# Patient Record
Sex: MALE | Race: WHITE | NOT HISPANIC OR LATINO | Employment: OTHER | URBAN - METROPOLITAN AREA
[De-identification: names, ages, dates, MRNs, and addresses within clinical notes are randomized per-mention and may not be internally consistent; named-entity substitution may affect disease eponyms.]

---

## 2017-06-04 ENCOUNTER — HOSPITAL ENCOUNTER (EMERGENCY)
Facility: HOSPITAL | Age: 62
Discharge: ANOTHER HOSPITAL | End: 2017-06-04
Attending: EMERGENCY MEDICINE
Payer: COMMERCIAL

## 2017-06-04 VITALS
HEART RATE: 66 BPM | BODY MASS INDEX: 27.2 KG/M2 | SYSTOLIC BLOOD PRESSURE: 138 MMHG | DIASTOLIC BLOOD PRESSURE: 87 MMHG | RESPIRATION RATE: 16 BRPM | TEMPERATURE: 97 F | OXYGEN SATURATION: 99 % | HEIGHT: 70 IN | WEIGHT: 190 LBS

## 2017-06-04 DIAGNOSIS — I21.4 NSTEMI (NON-ST ELEVATED MYOCARDIAL INFARCTION): ICD-10-CM

## 2017-06-04 DIAGNOSIS — R07.9 CHEST PAIN: Primary | ICD-10-CM

## 2017-06-04 LAB
ALBUMIN SERPL BCP-MCNC: 4.7 G/DL
ALP SERPL-CCNC: 71 U/L
ALT SERPL W/O P-5'-P-CCNC: 30 U/L
ANION GAP SERPL CALC-SCNC: 15 MMOL/L
APTT BLDCRRT: 27.1 SEC
AST SERPL-CCNC: 29 U/L
BASOPHILS # BLD AUTO: 0.1 K/UL
BASOPHILS NFR BLD: 0.4 %
BILIRUB SERPL-MCNC: 0.7 MG/DL
BUN SERPL-MCNC: 23 MG/DL
CALCIUM SERPL-MCNC: 9.8 MG/DL
CHLORIDE SERPL-SCNC: 106 MMOL/L
CO2 SERPL-SCNC: 18 MMOL/L
CREAT SERPL-MCNC: 1.3 MG/DL
D DIMER PPP IA.FEU-MCNC: 0.2 MG/L FEU
DIFFERENTIAL METHOD: ABNORMAL
EOSINOPHIL # BLD AUTO: 0.1 K/UL
EOSINOPHIL NFR BLD: 0.5 %
ERYTHROCYTE [DISTWIDTH] IN BLOOD BY AUTOMATED COUNT: 14.4 %
EST. GFR  (AFRICAN AMERICAN): >60 ML/MIN/1.73 M^2
EST. GFR  (NON AFRICAN AMERICAN): 59 ML/MIN/1.73 M^2
GLUCOSE SERPL-MCNC: 141 MG/DL
HCT VFR BLD AUTO: 42.3 %
HGB BLD-MCNC: 14.7 G/DL
INR PPP: 1
LYMPHOCYTES # BLD AUTO: 2.3 K/UL
LYMPHOCYTES NFR BLD: 17.5 %
MCH RBC QN AUTO: 31.7 PG
MCHC RBC AUTO-ENTMCNC: 34.7 %
MCV RBC AUTO: 91 FL
MONOCYTES # BLD AUTO: 0.8 K/UL
MONOCYTES NFR BLD: 5.9 %
NEUTROPHILS # BLD AUTO: 9.8 K/UL
NEUTROPHILS NFR BLD: 75.7 %
PLATELET # BLD AUTO: 270 K/UL
PMV BLD AUTO: 8.3 FL
POTASSIUM SERPL-SCNC: 3.9 MMOL/L
PROT SERPL-MCNC: 7.9 G/DL
PROTHROMBIN TIME: 10.6 SEC
RBC # BLD AUTO: 4.63 M/UL
SODIUM SERPL-SCNC: 139 MMOL/L
TROPONIN I SERPL DL<=0.01 NG/ML-MCNC: 0.66 NG/ML
WBC # BLD AUTO: 13 K/UL

## 2017-06-04 PROCEDURE — 25000003 PHARM REV CODE 250: Performed by: EMERGENCY MEDICINE

## 2017-06-04 PROCEDURE — 99285 EMERGENCY DEPT VISIT HI MDM: CPT | Mod: 25

## 2017-06-04 PROCEDURE — 85379 FIBRIN DEGRADATION QUANT: CPT

## 2017-06-04 PROCEDURE — 93005 ELECTROCARDIOGRAM TRACING: CPT

## 2017-06-04 PROCEDURE — 85730 THROMBOPLASTIN TIME PARTIAL: CPT

## 2017-06-04 PROCEDURE — 80053 COMPREHEN METABOLIC PANEL: CPT

## 2017-06-04 PROCEDURE — 96365 THER/PROPH/DIAG IV INF INIT: CPT

## 2017-06-04 PROCEDURE — 85610 PROTHROMBIN TIME: CPT

## 2017-06-04 PROCEDURE — 96375 TX/PRO/DX INJ NEW DRUG ADDON: CPT

## 2017-06-04 PROCEDURE — 36415 COLL VENOUS BLD VENIPUNCTURE: CPT

## 2017-06-04 PROCEDURE — 85025 COMPLETE CBC W/AUTO DIFF WBC: CPT

## 2017-06-04 PROCEDURE — 84484 ASSAY OF TROPONIN QUANT: CPT

## 2017-06-04 RX ORDER — HEPARIN SODIUM 200 [USP'U]/100ML
17 INJECTION, SOLUTION INTRAVENOUS
Status: DISCONTINUED | OUTPATIENT
Start: 2017-06-04 | End: 2017-06-04

## 2017-06-04 RX ORDER — NITROGLYCERIN 20 MG/100ML
10 INJECTION INTRAVENOUS CONTINUOUS
Status: DISCONTINUED | OUTPATIENT
Start: 2017-06-04 | End: 2017-06-04 | Stop reason: HOSPADM

## 2017-06-04 RX ORDER — HEPARIN SODIUM,PORCINE/D5W 25000/250
17 INTRAVENOUS SOLUTION INTRAVENOUS CONTINUOUS
Status: DISCONTINUED | OUTPATIENT
Start: 2017-06-04 | End: 2017-06-04 | Stop reason: HOSPADM

## 2017-06-04 RX ORDER — ATORVASTATIN CALCIUM 20 MG/1
20 TABLET, FILM COATED ORAL
Status: DISCONTINUED | OUTPATIENT
Start: 2017-06-04 | End: 2017-06-04 | Stop reason: HOSPADM

## 2017-06-04 RX ORDER — METOPROLOL TARTRATE 1 MG/ML
INJECTION, SOLUTION INTRAVENOUS
Status: DISCONTINUED
Start: 2017-06-04 | End: 2017-06-04 | Stop reason: HOSPADM

## 2017-06-04 RX ORDER — NITROGLYCERIN 0.4 MG/1
0.4 TABLET SUBLINGUAL
Status: COMPLETED | OUTPATIENT
Start: 2017-06-04 | End: 2017-06-04

## 2017-06-04 RX ORDER — METOPROLOL TARTRATE 1 MG/ML
5 INJECTION, SOLUTION INTRAVENOUS
Status: COMPLETED | OUTPATIENT
Start: 2017-06-04 | End: 2017-06-04

## 2017-06-04 RX ORDER — HEPARIN SODIUM 5000 [USP'U]/ML
5000 INJECTION, SOLUTION INTRAVENOUS; SUBCUTANEOUS
Status: COMPLETED | OUTPATIENT
Start: 2017-06-04 | End: 2017-06-04

## 2017-06-04 RX ORDER — ASPIRIN 325 MG
325 TABLET, DELAYED RELEASE (ENTERIC COATED) ORAL
Status: COMPLETED | OUTPATIENT
Start: 2017-06-04 | End: 2017-06-04

## 2017-06-04 RX ORDER — NITROGLYCERIN 20 MG/100ML
INJECTION INTRAVENOUS
Status: DISCONTINUED
Start: 2017-06-04 | End: 2017-06-04 | Stop reason: HOSPADM

## 2017-06-04 RX ORDER — HEPARIN SODIUM 10000 [USP'U]/100ML
INJECTION, SOLUTION INTRAVENOUS
Status: DISCONTINUED
Start: 2017-06-04 | End: 2017-06-04 | Stop reason: HOSPADM

## 2017-06-04 RX ORDER — PANTOPRAZOLE SODIUM 40 MG/1
40 TABLET, DELAYED RELEASE ORAL
Status: COMPLETED | OUTPATIENT
Start: 2017-06-04 | End: 2017-06-04

## 2017-06-04 RX ADMIN — ASPIRIN 325 MG: 325 TABLET, DELAYED RELEASE ORAL at 03:06

## 2017-06-04 RX ADMIN — NITROGLYCERIN 1 INCH: 20 OINTMENT TOPICAL at 02:06

## 2017-06-04 RX ADMIN — NITROGLYCERIN 10 MCG/MIN: 20 INJECTION INTRAVENOUS at 03:06

## 2017-06-04 RX ADMIN — PANTOPRAZOLE SODIUM 40 MG: 40 TABLET, DELAYED RELEASE ORAL at 02:06

## 2017-06-04 RX ADMIN — HEPARIN SODIUM 5000 UNITS: 5000 INJECTION, SOLUTION INTRAVENOUS; SUBCUTANEOUS at 03:06

## 2017-06-04 RX ADMIN — LIDOCAINE HYDROCHLORIDE: 20 SOLUTION ORAL; TOPICAL at 02:06

## 2017-06-04 RX ADMIN — NITROGLYCERIN 0.4 MG: 0.4 TABLET SUBLINGUAL at 01:06

## 2017-06-04 RX ADMIN — METOPROLOL TARTRATE 5 MG: 5 INJECTION, SOLUTION INTRAVENOUS at 01:06

## 2017-06-04 NOTE — ED NOTES
"Currently pain free. Skin now dry. "Feeling much better" - S/P meds. O2 @ 4L/min & cardiac monitor in use  "

## 2017-06-04 NOTE — ED PROVIDER NOTES
Encounter Date: 6/4/2017    SCRIBE #1 NOTE: I, Nicola Cárdenas, am scribing for, and in the presence of, Dr. Chaparro .       History     Chief Complaint   Patient presents with    Chest Pain     off and on since this am with diaphoresis, lightheadedness.     Review of patient's allergies indicates:   Allergen Reactions    No known drug allergies      06/04/2017  1:56 PM     Chief Complaint: CP      The patient is a 61 y.o. male who is presenting c/o acute onset of CP since six hours ago. He describes his pain as an intermittent discomfort, 5/10 in severity prior to ED arrival, and located in center of his chest without radiating to his extremities. He reports that his chest pain first started six hours ago, stopped, reappeared three hours ago, and stopped again. Upon arrival to ED, he currently has chest pain, which began approximately 30 mins prior to arrival. He also c/o associated diaphoresis. His chest pain is currently a 1/10 in severity. He denies SOB, nausea, vomiting, neck pain, back pain, or leg pain. Pt denies previous hx of heart problems or FMHx of heart problems. Pt does not smoke cigarettes, which he stopped approximately 40 years ago. Pt has a PMHx of gout and HTN. Pt has a PSHx that includes skin graph.        The history is provided by the patient.     Past Medical History:   Diagnosis Date    Gout      Past Surgical History:   Procedure Laterality Date    skin graph       Family History   Problem Relation Age of Onset    Gout Father     Diabetes Father     Hypertension Mother      Social History   Substance Use Topics    Smoking status: Former Smoker    Smokeless tobacco: Not on file    Alcohol use 4.2 oz/week     7 Standard drinks or equivalent per week     Review of Systems   Constitutional: Positive for diaphoresis. Negative for activity change, appetite change, chills, fatigue and fever.   Eyes: Negative for visual disturbance.   Respiratory: Negative for apnea and shortness of breath.     Cardiovascular: Negative for chest pain (intermittent) and palpitations.   Gastrointestinal: Negative for abdominal pain, diarrhea, nausea and vomiting.   Genitourinary: Negative for difficulty urinating.   Musculoskeletal: Negative for back pain, myalgias (leg) and neck pain.   Skin: Negative for pallor and rash.   Neurological: Negative for headaches.   Hematological: Does not bruise/bleed easily.   Psychiatric/Behavioral: Negative for agitation.       Physical Exam     Initial Vitals [06/04/17 1343]   BP Pulse Resp Temp SpO2   134/89 81 16 97.4 °F (36.3 °C) 98 %     Physical Exam    Nursing note and vitals reviewed.  Constitutional: He appears well-developed and well-nourished. He is diaphoretic.   HENT:   Head: Normocephalic and atraumatic.   Eyes: Conjunctivae are normal.   Neck: Normal range of motion. Neck supple.   Cardiovascular: Normal rate, regular rhythm and normal heart sounds.   Pulmonary/Chest: Breath sounds normal. No respiratory distress.   Abdominal: Soft.   Musculoskeletal: Normal range of motion.   Neurological: He is alert and oriented to person, place, and time.   Skin: Skin is warm.   Psychiatric: He has a normal mood and affect.         ED Course   Critical Care  Date/Time: 6/4/2017 4:12 PM  Performed by: KYA WEATHERS III  Authorized by: KYA WEATHERS III   Direct patient critical care time: 150 minutes  Total critical care time (exclusive of procedural time) : 150 minutes  Critical care was time spent personally by me on the following activities: review of old charts, pulse oximetry, ordering and review of laboratory studies, obtaining history from patient or surrogate, evaluation of patient's response to treatment, discussions with consultants, development of treatment plan with patient or surrogate, discussions with primary provider, examination of patient, ordering and performing treatments and interventions, ordering and review of radiographic studies and re-evaluation of  patient's condition.  Subsequent provider of critical care: I assumed direction of critical care for this patient from another provider of my specialty.        Labs Reviewed   CBC W/ AUTO DIFFERENTIAL - Abnormal; Notable for the following:        Result Value    WBC 13.00 (*)     MCH 31.7 (*)     MPV 8.3 (*)     Gran # 9.8 (*)     Gran% 75.7 (*)     Lymph% 17.5 (*)     All other components within normal limits   COMPREHENSIVE METABOLIC PANEL - Abnormal; Notable for the following:     CO2 18 (*)     Glucose 141 (*)     eGFR if non  59 (*)     All other components within normal limits   TROPONIN I - Abnormal; Notable for the following:     Troponin I 0.663 (*)     All other components within normal limits   D DIMER, QUANTITATIVE   APTT    Narrative:     INR if patient is on warfarin prior to heparin therapy if not  ordered already   PROTIME-INR    Narrative:     INR if patient is on warfarin prior to heparin therapy if not  ordered already     EKG Readings: (Independently Interpreted)   Rhythm: Normal Sinus Rhythm. Heart Rate: 75. Ectopy: No Ectopy. Conduction: RBBB. ST Segments: Normal ST Segments. T Waves: Normal. Axis: Normal. Clinical Impression: Normal Sinus Rhythm          Medical Decision Making:   Independently Interpreted Test(s):   I have ordered and independently interpreted X-rays - see summary below.       <> Summary of X-Ray Reading(s): chest x-ray interpreted by me reveals no infiltrates, effusions or mediastinal widening  Clinical Tests:   Lab Tests: Ordered and Reviewed  The following lab test(s) were unremarkable: CBC and CMP  ED Management:  3:08 PM - Pt's chest pain has increased from 1/10 to 3/10 in severity.   Patient presents with intermittent chest pain which became more severe 30 minutes prior to arrival with associated diaphoresis.  Initial EKG reveals a right bundle branch block of uncertain acuity with no definitive ST-T wave changes.  Symptoms are strongly suggestive of an  MI with a significantly elevated troponin of 0.663 consistent with a non-ST elevation MI.  He isn't initiated on oral aspirin, Lipitor, and heparin infusion after a bolus and IV nitroglycerin.  After sublingual nitroglycerin initially the pain decreased from a 3-4 out of 10 down to a 1 out of 10 only to return 3 of 10.  Repeat EKG once again demonstrates no acute ST-T wave changes.  I discussed the case with Dr. Michaud proposes transfer to [facility] for cardiac catheterization.  Discussed with Dr. Mendoza who will admit the patient at [facility].              Scribe Attestation:   Scribe #1: I performed the above scribed service and the documentation accurately describes the services I performed. I attest to the accuracy of the note.    Attending Attestation:           Physician Attestation for Scribe:  Physician Attestation Statement for Scribe #1: I, Dr. Chaparro, reviewed documentation, as scribed by Nicola Cárdenas in my presence, and it is both accurate and complete.                 ED Course     Clinical Impression:   The primary encounter diagnosis was Chest pain. A diagnosis of NSTEMI (non-ST elevated myocardial infarction) was also pertinent to this visit.          Murphy Chaparro III, MD  06/04/17 8943

## 2017-06-04 NOTE — ED NOTES
Departs in NAD with [facility] with Heparin & Tridil infusing via pump bound for direct admit to ICU @ [facility]

## 2017-06-05 ENCOUNTER — HOSPITAL ENCOUNTER (INPATIENT)
Facility: HOSPITAL | Age: 62
LOS: 3 days | Discharge: HOME | DRG: 247 | End: 2017-06-08
Attending: INTERNAL MEDICINE | Admitting: INTERNAL MEDICINE
Payer: COMMERCIAL

## 2017-06-05 DIAGNOSIS — I25.110 CORONARY ARTERY DISEASE INVOLVING NATIVE CORONARY ARTERY OF NATIVE HEART WITH UNSTABLE ANGINA PECTORIS: ICD-10-CM

## 2017-06-05 DIAGNOSIS — I10 ESSENTIAL HYPERTENSION: ICD-10-CM

## 2017-06-05 DIAGNOSIS — R07.9 CHEST PAIN: ICD-10-CM

## 2017-06-05 DIAGNOSIS — I25.118 CORONARY ARTERY DISEASE INVOLVING NATIVE CORONARY ARTERY OF NATIVE HEART WITH OTHER FORM OF ANGINA PECTORIS: ICD-10-CM

## 2017-06-05 DIAGNOSIS — I21.4 NSTEMI (NON-ST ELEVATED MYOCARDIAL INFARCTION): Primary | ICD-10-CM

## 2017-06-05 DIAGNOSIS — M10.9 GOUT: ICD-10-CM

## 2017-06-05 LAB
ABO + RH BLD: NORMAL
ALBUMIN SERPL BCP-MCNC: 3.7 G/DL
ALP SERPL-CCNC: 62 U/L
ALT SERPL W/O P-5'-P-CCNC: 45 U/L
ANION GAP SERPL CALC-SCNC: 10 MMOL/L
APTT BLDCRRT: 35.7 SEC
AST SERPL-CCNC: 170 U/L
BASOPHILS # BLD AUTO: 0.01 K/UL
BASOPHILS NFR BLD: 0.1 %
BILIRUB SERPL-MCNC: 0.8 MG/DL
BLD GP AB SCN CELLS X3 SERPL QL: NORMAL
BNP SERPL-MCNC: 461 PG/ML
BUN SERPL-MCNC: 18 MG/DL
CALCIUM SERPL-MCNC: 8.7 MG/DL
CHLORIDE SERPL-SCNC: 105 MMOL/L
CO2 SERPL-SCNC: 22 MMOL/L
CREAT SERPL-MCNC: 1.2 MG/DL
DIFFERENTIAL METHOD: ABNORMAL
EOSINOPHIL # BLD AUTO: 0.1 K/UL
EOSINOPHIL NFR BLD: 0.7 %
ERYTHROCYTE [DISTWIDTH] IN BLOOD BY AUTOMATED COUNT: 14.1 %
EST. GFR  (AFRICAN AMERICAN): >60 ML/MIN/1.73 M^2
EST. GFR  (NON AFRICAN AMERICAN): >60 ML/MIN/1.73 M^2
GLUCOSE SERPL-MCNC: 108 MG/DL
HCT VFR BLD AUTO: 40.1 %
HGB BLD-MCNC: 13.5 G/DL
INR PPP: 1
LYMPHOCYTES # BLD AUTO: 1.3 K/UL
LYMPHOCYTES NFR BLD: 15.4 %
MAGNESIUM SERPL-MCNC: 1.8 MG/DL
MCH RBC QN AUTO: 31.9 PG
MCHC RBC AUTO-ENTMCNC: 33.7 %
MCV RBC AUTO: 95 FL
MONOCYTES # BLD AUTO: 0.6 K/UL
MONOCYTES NFR BLD: 7.4 %
NEUTROPHILS # BLD AUTO: 6.6 K/UL
NEUTROPHILS NFR BLD: 76.1 %
PHOSPHATE SERPL-MCNC: 2.5 MG/DL
PLATELET # BLD AUTO: 181 K/UL
PMV BLD AUTO: 10.6 FL
POTASSIUM SERPL-SCNC: 3.9 MMOL/L
PROT SERPL-MCNC: 6.6 G/DL
PROTHROMBIN TIME: 10.4 SEC
RBC # BLD AUTO: 4.23 M/UL
SODIUM SERPL-SCNC: 137 MMOL/L
WBC # BLD AUTO: 8.69 K/UL

## 2017-06-05 PROCEDURE — 85610 PROTHROMBIN TIME: CPT

## 2017-06-05 PROCEDURE — 93010 ELECTROCARDIOGRAM REPORT: CPT | Mod: S$GLB,,, | Performed by: INTERNAL MEDICINE

## 2017-06-05 PROCEDURE — 20000000 HC ICU ROOM

## 2017-06-05 PROCEDURE — 84100 ASSAY OF PHOSPHORUS: CPT

## 2017-06-05 PROCEDURE — 80061 LIPID PANEL: CPT

## 2017-06-05 PROCEDURE — 86901 BLOOD TYPING SEROLOGIC RH(D): CPT

## 2017-06-05 PROCEDURE — 83735 ASSAY OF MAGNESIUM: CPT

## 2017-06-05 PROCEDURE — 80053 COMPREHEN METABOLIC PANEL: CPT

## 2017-06-05 PROCEDURE — 85025 COMPLETE CBC W/AUTO DIFF WBC: CPT

## 2017-06-05 PROCEDURE — 83880 ASSAY OF NATRIURETIC PEPTIDE: CPT

## 2017-06-05 PROCEDURE — 85730 THROMBOPLASTIN TIME PARTIAL: CPT

## 2017-06-05 PROCEDURE — 86900 BLOOD TYPING SEROLOGIC ABO: CPT

## 2017-06-05 RX ORDER — CLOPIDOGREL 300 MG/1
600 TABLET, FILM COATED ORAL ONCE
Status: DISCONTINUED | OUTPATIENT
Start: 2017-06-05 | End: 2017-06-05

## 2017-06-05 RX ORDER — CLOPIDOGREL BISULFATE 75 MG/1
75 TABLET ORAL DAILY
Status: DISCONTINUED | OUTPATIENT
Start: 2017-06-06 | End: 2017-06-05

## 2017-06-05 RX ORDER — HEPARIN SODIUM,PORCINE/D5W 25000/250
17 INTRAVENOUS SOLUTION INTRAVENOUS CONTINUOUS
Status: DISCONTINUED | OUTPATIENT
Start: 2017-06-06 | End: 2017-06-06

## 2017-06-05 RX ORDER — SODIUM CHLORIDE 0.9 % (FLUSH) 0.9 %
3 SYRINGE (ML) INJECTION EVERY 8 HOURS
Status: DISCONTINUED | OUTPATIENT
Start: 2017-06-06 | End: 2017-06-08 | Stop reason: HOSPADM

## 2017-06-05 RX ORDER — ATORVASTATIN CALCIUM 20 MG/1
80 TABLET, FILM COATED ORAL DAILY
Status: DISCONTINUED | OUTPATIENT
Start: 2017-06-06 | End: 2017-06-08 | Stop reason: HOSPADM

## 2017-06-05 RX ORDER — ASPIRIN 81 MG/1
81 TABLET ORAL DAILY
Status: DISCONTINUED | OUTPATIENT
Start: 2017-06-06 | End: 2017-06-08 | Stop reason: HOSPADM

## 2017-06-05 RX ORDER — CLOPIDOGREL 300 MG/1
600 TABLET, FILM COATED ORAL ONCE
Status: COMPLETED | OUTPATIENT
Start: 2017-06-05 | End: 2017-06-06

## 2017-06-05 RX ORDER — CLOPIDOGREL BISULFATE 75 MG/1
75 TABLET ORAL DAILY
Status: DISCONTINUED | OUTPATIENT
Start: 2017-06-06 | End: 2017-06-08 | Stop reason: HOSPADM

## 2017-06-05 RX ADMIN — HEPARIN SODIUM AND DEXTROSE 17 UNITS/KG/HR: 10000; 5 INJECTION INTRAVENOUS at 11:06

## 2017-06-06 PROBLEM — I25.10 CORONARY ARTERY DISEASE INVOLVING NATIVE CORONARY ARTERY OF NATIVE HEART: Status: ACTIVE | Noted: 2017-06-06

## 2017-06-06 LAB
ALBUMIN SERPL BCP-MCNC: 3.5 G/DL
ALP SERPL-CCNC: 59 U/L
ALT SERPL W/O P-5'-P-CCNC: 38 U/L
ANION GAP SERPL CALC-SCNC: 9 MMOL/L
AST SERPL-CCNC: 119 U/L
BASOPHILS # BLD AUTO: 0.01 K/UL
BASOPHILS NFR BLD: 0.1 %
BILIRUB SERPL-MCNC: 0.9 MG/DL
BUN SERPL-MCNC: 14 MG/DL
CALCIUM SERPL-MCNC: 8.7 MG/DL
CHLORIDE SERPL-SCNC: 104 MMOL/L
CHOLEST/HDLC SERPL: 5.6 {RATIO}
CO2 SERPL-SCNC: 23 MMOL/L
CREAT SERPL-MCNC: 1 MG/DL
DIFFERENTIAL METHOD: ABNORMAL
EOSINOPHIL # BLD AUTO: 0.1 K/UL
EOSINOPHIL NFR BLD: 0.9 %
ERYTHROCYTE [DISTWIDTH] IN BLOOD BY AUTOMATED COUNT: 13.8 %
EST. GFR  (AFRICAN AMERICAN): >60 ML/MIN/1.73 M^2
EST. GFR  (NON AFRICAN AMERICAN): >60 ML/MIN/1.73 M^2
FACT X PPP CHRO-ACNC: 0.38 IU/ML
GLUCOSE SERPL-MCNC: 104 MG/DL
HCT VFR BLD AUTO: 38.4 %
HDL/CHOLESTEROL RATIO: 17.8 %
HDLC SERPL-MCNC: 185 MG/DL
HDLC SERPL-MCNC: 33 MG/DL
HGB BLD-MCNC: 13 G/DL
LDLC SERPL CALC-MCNC: 119.8 MG/DL
LYMPHOCYTES # BLD AUTO: 1 K/UL
LYMPHOCYTES NFR BLD: 12.9 %
MAGNESIUM SERPL-MCNC: 1.9 MG/DL
MCH RBC QN AUTO: 31.8 PG
MCHC RBC AUTO-ENTMCNC: 33.9 %
MCV RBC AUTO: 94 FL
MONOCYTES # BLD AUTO: 0.7 K/UL
MONOCYTES NFR BLD: 9 %
NEUTROPHILS # BLD AUTO: 6.2 K/UL
NEUTROPHILS NFR BLD: 76.8 %
NONHDLC SERPL-MCNC: 152 MG/DL
PLATELET # BLD AUTO: 173 K/UL
PMV BLD AUTO: 10.3 FL
POTASSIUM SERPL-SCNC: 3.9 MMOL/L
PROT SERPL-MCNC: 6.2 G/DL
RBC # BLD AUTO: 4.09 M/UL
SODIUM SERPL-SCNC: 136 MMOL/L
TRIGL SERPL-MCNC: 161 MG/DL
WBC # BLD AUTO: 8 K/UL

## 2017-06-06 PROCEDURE — 20600001 HC STEP DOWN PRIVATE ROOM

## 2017-06-06 PROCEDURE — 94761 N-INVAS EAR/PLS OXIMETRY MLT: CPT

## 2017-06-06 PROCEDURE — 25000003 PHARM REV CODE 250: Performed by: INTERNAL MEDICINE

## 2017-06-06 PROCEDURE — 93306 TTE W/DOPPLER COMPLETE: CPT

## 2017-06-06 PROCEDURE — 93306 TTE W/DOPPLER COMPLETE: CPT | Mod: 26,,, | Performed by: INTERNAL MEDICINE

## 2017-06-06 PROCEDURE — 27000221 HC OXYGEN, UP TO 24 HOURS

## 2017-06-06 PROCEDURE — 80053 COMPREHEN METABOLIC PANEL: CPT

## 2017-06-06 PROCEDURE — 85025 COMPLETE CBC W/AUTO DIFF WBC: CPT

## 2017-06-06 PROCEDURE — 85520 HEPARIN ASSAY: CPT

## 2017-06-06 PROCEDURE — 99222 1ST HOSP IP/OBS MODERATE 55: CPT | Mod: ,,, | Performed by: INTERNAL MEDICINE

## 2017-06-06 PROCEDURE — 99255 IP/OBS CONSLTJ NEW/EST HI 80: CPT | Mod: ,,, | Performed by: NURSE PRACTITIONER

## 2017-06-06 PROCEDURE — 83735 ASSAY OF MAGNESIUM: CPT

## 2017-06-06 RX ORDER — LOSARTAN POTASSIUM 50 MG/1
100 TABLET ORAL DAILY
Status: DISCONTINUED | OUTPATIENT
Start: 2017-06-07 | End: 2017-06-08 | Stop reason: HOSPADM

## 2017-06-06 RX ORDER — HYDROCODONE BITARTRATE AND ACETAMINOPHEN 5; 325 MG/1; MG/1
1 TABLET ORAL EVERY 6 HOURS PRN
Status: DISCONTINUED | OUTPATIENT
Start: 2017-06-06 | End: 2017-06-08 | Stop reason: HOSPADM

## 2017-06-06 RX ORDER — ACETAMINOPHEN 325 MG/1
650 TABLET ORAL EVERY 6 HOURS PRN
Status: DISCONTINUED | OUTPATIENT
Start: 2017-06-06 | End: 2017-06-08 | Stop reason: HOSPADM

## 2017-06-06 RX ORDER — METOPROLOL TARTRATE 25 MG/1
25 TABLET, FILM COATED ORAL 2 TIMES DAILY
Status: DISCONTINUED | OUTPATIENT
Start: 2017-06-06 | End: 2017-06-08 | Stop reason: HOSPADM

## 2017-06-06 RX ADMIN — ASPIRIN 81 MG: 81 TABLET, COATED ORAL at 08:06

## 2017-06-06 RX ADMIN — CLOPIDOGREL 75 MG: 75 TABLET, FILM COATED ORAL at 08:06

## 2017-06-06 RX ADMIN — ACETAMINOPHEN 650 MG: 325 TABLET ORAL at 06:06

## 2017-06-06 RX ADMIN — METOPROLOL TARTRATE 25 MG: 25 TABLET ORAL at 10:06

## 2017-06-06 RX ADMIN — Medication 3 ML: at 09:06

## 2017-06-06 RX ADMIN — HEPARIN SODIUM AND DEXTROSE 17 UNITS/KG/HR: 10000; 5 INJECTION INTRAVENOUS at 09:06

## 2017-06-06 RX ADMIN — CLOPIDOGREL BISULFATE 600 MG: 300 TABLET, FILM COATED ORAL at 01:06

## 2017-06-06 RX ADMIN — ACETAMINOPHEN 650 MG: 325 TABLET ORAL at 08:06

## 2017-06-06 RX ADMIN — METOPROLOL TARTRATE 25 MG: 25 TABLET ORAL at 08:06

## 2017-06-06 RX ADMIN — ATORVASTATIN CALCIUM 80 MG: 20 TABLET, FILM COATED ORAL at 08:06

## 2017-06-06 NOTE — PROGRESS NOTES
[facility]  Cardiology  Progress Note    Patient Name: Vidal Haddad  MRN: [id number]  Admission Date: 6/5/2017  Hospital Length of Stay: 1 days  Code Status: Full Code   Attending Physician: Lul Patel MD   Primary Care Physician: Jefe Gongora DO  Expected Discharge Date: 6/7/2017  Principal Problem:NSTEMI (non-ST elevated myocardial infarction)    Subjective:     Hospital Course:   61 year old male with hx of HTN presented to [facility] on 6/4/2017 with chest pain found to have NSTEMI. Had LHC on 6/5/2017 that showed LAD 95% with  of LCx and a trop of 24. Patient transferred to [facility] for PCI and CABG evaluation.      Interval History:   NAEON. Patient chest pain free currently. Provides no active complaints this am.    ROS   Constitutional: no fever or chills  ENT: no nasal congestion or sore throat  Respiratory: no cough or shortness of breath  Cardiovascular: no chest pain or palpitations  Gastrointestinal: no nausea or vomiting, no abdominal pain or abdominal distention   Genitourinary: no hematuria or dysuria  Integument/Breast: no rash or pruritis  Hematologic/Lymphatic: no easy bruising or lymphadenopathy  Musculoskeletal: no arthralgias or myalgias  Neurological: no seizures or tremors  Endocrine: no heat or cold intolerance    Objective:     Vital Signs (Most Recent):  Temp: 98.3 °F (36.8 °C) (06/05/17 2300)  Pulse: 69 (06/06/17 0800)  Resp: (!) 23 (06/06/17 0800)  BP: 115/74 (06/06/17 0800)  SpO2: 97 % (06/06/17 0800) Vital Signs (24h Range):  Temp:  [98.3 °F (36.8 °C)-98.5 °F (36.9 °C)] 98.3 °F (36.8 °C)  Pulse:  [60-75] 69  Resp:  [16-39] 23  SpO2:  [91 %-98 %] 97 %  BP: ()/(55-84) 115/74     Weight: 89 kg (196 lb 3.4 oz)  Body mass index is 28.98 kg/m².    SpO2: 97 %  O2 Device (Oxygen Therapy): nasal cannula      Intake/Output Summary (Last 24 hours) at 06/06/17 0815  Last data filed at 06/06/17 0400   Gross per 24 hour   Intake            71.22 ml   Output                 0 ml   Net            71.22 ml       Lines/Drains/Airways     Peripheral Intravenous Line                 Peripheral IV - Single Lumen 06/04/17 1353 Left Hand 1 day         Peripheral IV - Single Lumen 06/04/17 1354 Right Hand 1 day                Physical Exam    General: well developed, well nourished, appears stated age, no distress, pale  Eyes: conjunctivae/corneas clear. PERRL..  Neck: supple, symmetrical, trachea midline, no JVD  Cardiovascular: Heart: regular rate and rhythm, S1, S2 normal, no murmur, click, rub or gallop.  Lungs: clear to auscultation bilaterally and normal respiratory effort  Chest Wall: no tenderness  Abdomen/Rectal: Abdomen: Non distended. + BS. No masses. No TTP. No rebound or guarding. Negative Dang's sign. Rectal: not examined  Extremities: no edema, redness or tenderness in the calves or thighs. Pulses: 2+ and symmetric  Skin: Skin color, texture, turgor normal. No rashes or lesions  Musculoskeletal: full range of motion of joints  Lymph Nodes: No cervical or supraclavicular adenopathy  Psych/Behavioral: Normal. Alert and oriented, appropriate affect.    Significant Labs:   Recent Results (from the past 24 hour(s))   Comprehensive metabolic panel    Collection Time: 06/05/17 10:30 PM   Result Value Ref Range    Sodium 137 136 - 145 mmol/L    Potassium 3.9 3.5 - 5.1 mmol/L    Chloride 105 95 - 110 mmol/L    CO2 22 (L) 23 - 29 mmol/L    Glucose 108 70 - 110 mg/dL    BUN, Bld 18 8 - 23 mg/dL    Creatinine 1.2 0.5 - 1.4 mg/dL    Calcium 8.7 8.7 - 10.5 mg/dL    Total Protein 6.6 6.0 - 8.4 g/dL    Albumin 3.7 3.5 - 5.2 g/dL    Total Bilirubin 0.8 0.1 - 1.0 mg/dL    Alkaline Phosphatase 62 55 - 135 U/L     (H) 10 - 40 U/L    ALT 45 (H) 10 - 44 U/L    Anion Gap 10 8 - 16 mmol/L    eGFR if African American >60.0 >60 mL/min/1.73 m^2    eGFR if non African American >60.0 >60 mL/min/1.73 m^2   Magnesium    Collection Time: 06/05/17 10:30 PM   Result Value Ref Range    Magnesium 1.8  1.6 - 2.6 mg/dL   Phosphorus    Collection Time: 06/05/17 10:30 PM   Result Value Ref Range    Phosphorus 2.5 (L) 2.7 - 4.5 mg/dL   CBC auto differential    Collection Time: 06/05/17 10:30 PM   Result Value Ref Range    WBC 8.69 3.90 - 12.70 K/uL    RBC 4.23 (L) 4.60 - 6.20 M/uL    Hemoglobin 13.5 (L) 14.0 - 18.0 g/dL    Hematocrit 40.1 40.0 - 54.0 %    MCV 95 82 - 98 fL    MCH 31.9 (H) 27.0 - 31.0 pg    MCHC 33.7 32.0 - 36.0 %    RDW 14.1 11.5 - 14.5 %    Platelets 181 150 - 350 K/uL    MPV 10.6 9.2 - 12.9 fL    Gran # 6.6 1.8 - 7.7 K/uL    Lymph # 1.3 1.0 - 4.8 K/uL    Mono # 0.6 0.3 - 1.0 K/uL    Eos # 0.1 0.0 - 0.5 K/uL    Baso # 0.01 0.00 - 0.20 K/uL    Gran% 76.1 (H) 38.0 - 73.0 %    Lymph% 15.4 (L) 18.0 - 48.0 %    Mono% 7.4 4.0 - 15.0 %    Eosinophil% 0.7 0.0 - 8.0 %    Basophil% 0.1 0.0 - 1.9 %    Differential Method Automated    APTT    Collection Time: 06/05/17 10:30 PM   Result Value Ref Range    aPTT 35.7 (H) 21.0 - 32.0 sec   Protime-INR    Collection Time: 06/05/17 10:30 PM   Result Value Ref Range    Prothrombin Time 10.4 9.0 - 12.5 sec    INR 1.0 0.8 - 1.2   Brain natriuretic peptide    Collection Time: 06/05/17 10:30 PM   Result Value Ref Range     (H) 0 - 99 pg/mL   Type & Screen    Collection Time: 06/05/17 10:30 PM   Result Value Ref Range    Group & Rh O POS     Indirect Travon NEG    Lipid panel    Collection Time: 06/05/17 10:30 PM   Result Value Ref Range    Cholesterol 185 120 - 199 mg/dL    Triglycerides 161 (H) 30 - 150 mg/dL    HDL 33 (L) 40 - 75 mg/dL    LDL Cholesterol 119.8 63.0 - 159.0 mg/dL    HDL/Chol Ratio 17.8 (L) 20.0 - 50.0 %    Total Cholesterol/HDL Ratio 5.6 (H) 2.0 - 5.0    Non-HDL Cholesterol 152 mg/dL   CBC auto differential    Collection Time: 06/06/17  5:00 AM   Result Value Ref Range    WBC 8.00 3.90 - 12.70 K/uL    RBC 4.09 (L) 4.60 - 6.20 M/uL    Hemoglobin 13.0 (L) 14.0 - 18.0 g/dL    Hematocrit 38.4 (L) 40.0 - 54.0 %    MCV 94 82 - 98 fL    MCH 31.8 (H) 27.0  - 31.0 pg    MCHC 33.9 32.0 - 36.0 %    RDW 13.8 11.5 - 14.5 %    Platelets 173 150 - 350 K/uL    MPV 10.3 9.2 - 12.9 fL    Gran # 6.2 1.8 - 7.7 K/uL    Lymph # 1.0 1.0 - 4.8 K/uL    Mono # 0.7 0.3 - 1.0 K/uL    Eos # 0.1 0.0 - 0.5 K/uL    Baso # 0.01 0.00 - 0.20 K/uL    Gran% 76.8 (H) 38.0 - 73.0 %    Lymph% 12.9 (L) 18.0 - 48.0 %    Mono% 9.0 4.0 - 15.0 %    Eosinophil% 0.9 0.0 - 8.0 %    Basophil% 0.1 0.0 - 1.9 %    Differential Method Automated    Comprehensive metabolic panel    Collection Time: 06/06/17  5:00 AM   Result Value Ref Range    Sodium 136 136 - 145 mmol/L    Potassium 3.9 3.5 - 5.1 mmol/L    Chloride 104 95 - 110 mmol/L    CO2 23 23 - 29 mmol/L    Glucose 104 70 - 110 mg/dL    BUN, Bld 14 8 - 23 mg/dL    Creatinine 1.0 0.5 - 1.4 mg/dL    Calcium 8.7 8.7 - 10.5 mg/dL    Total Protein 6.2 6.0 - 8.4 g/dL    Albumin 3.5 3.5 - 5.2 g/dL    Total Bilirubin 0.9 0.1 - 1.0 mg/dL    Alkaline Phosphatase 59 55 - 135 U/L     (H) 10 - 40 U/L    ALT 38 10 - 44 U/L    Anion Gap 9 8 - 16 mmol/L    eGFR if African American >60.0 >60 mL/min/1.73 m^2    eGFR if non African American >60.0 >60 mL/min/1.73 m^2   Magnesium    Collection Time: 06/06/17  5:00 AM   Result Value Ref Range    Magnesium 1.9 1.6 - 2.6 mg/dL   Anti-Xa Heparin Monitoring    Collection Time: 06/06/17  5:00 AM   Result Value Ref Range    Heparin Anti-Xa 0.38 0.30 - 0.70 IU/mL       Assessment and Plan:       Active Diagnoses:    Diagnosis Date Noted POA    PRINCIPAL PROBLEM:  NSTEMI (non-ST elevated myocardial infarction) [I21.4] 06/05/2017 Yes      Problems Resolved During this Admission:    Diagnosis Date Noted Date Resolved POA     NSTEMI  - LHC from OSH on 6/5/2017 revealing LAD 95% stenosis and  of LCx  - Continue medical management for NSTE-ACS, patient loaded and started on ASA, high-dose statin and lopressor at OSH,   - currently on heparin gtt. Holding lopressor currently due to bradycardia.  - loaded with Plavix 600 mg x  currently on 75 mg po daily  - cath films reviewed by interventional who are considering [facility] this am  - CTS evaluation for CABG  - follow 2D echo w/ CFD   - NPO      Hypertension  - Patient currently hemodynamically stable, BP controlled on own, will hold anti-hypertensives and lopressor at this time      Diet: npo  Code: full  dvt ppx: on heparin gtt    Case and plan to be discussed with Dr. Patel.      VTE Risk Mitigation         Ordered     Medium Risk of VTE  Once      06/05/17 7944          KIMO Doan  Cardiology  [facility]

## 2017-06-06 NOTE — NURSING
Pt received from [facility], placed on monitor heparin infusing @ 1200units/h no complaint of chest pain at this time. Service notified of pt arrival.

## 2017-06-06 NOTE — PROGRESS NOTES
Patient to be transferred to [facility].  Report called and given to MARISOL Jeffers.

## 2017-06-06 NOTE — NURSING TRANSFER
Nursing Transfer Note      6/6/2017     Transfer To: 390-A    Transfer via wheelchair    Transfer with cardiac monitoring, personal belongings    Transported by RN    Medicines sent: n/a    Chart send with patient: Yes    Notified: spouse at bedside    Upon arrival to floor: patient oriented to room, call bell in reach and bed in lowest position. MARISOL Jeffers aware of patient's arrival to [facility].

## 2017-06-06 NOTE — PROVIDER TRANSFER
Handoff     Primary Team: Networked reference to record PCT  Room Number: 3092/3092 A     Patient Name: Vidal Haddad MRN: [id number]     Date of Birth: [id number] Allergies: No known drug allergies     Age: 61 y.o. Admit Date: 6/5/2017     Sex: male  BMI: Body mass index is 28.98 kg/m².     Code Status: Full Code        Illness Level (current clinical status): Watcher - No    Reason for Admission: NSTEMI (non-ST elevated myocardial infarction)    Brief HPI and hospital course:  Patient presented 6/4/2017 morning to [facility] ED with chest pain x 1 day found to have NSTEMI started on ACS protocol and later transferred to [facility] for LHC by Dr. Talbert. LHC done on 6/5/2017 revealed mid LAD 95% stenosis along with  of LCx failed antegrade passes and angioplasty. The patient was transferred here for CTS and PCI evaluation. CTS has seen the patient today and patient expressed preference in trying the less invasive route through PCI. Interventional cardiology are following for the appropriate time for performing PCI. The patient was otherwise stable and appropriate for the floor.         Pertinent studies:  2D echo 6/6/2017    CONCLUSIONS     1 - Low normal to mildly depressed left ventricular systolic function (EF 50-55%). Akinetic LV apex and mid to distal anteroseptum. Apical thrombus cannot be ruled out. Consider limited study with contrast to evaluate for Apical thrombus.     2 - Indeterminate LV diastolic function.     3 - The estimated PA systolic pressure is greater than 39 mmHg.     4 - Mild mitral regurgitation.     5 - Trivial to mild tricuspid regurgitation.       Tasks:  NSTEMI  - Continue medical management for NSTE-ACS, patient loaded and started on ASA and plavix, high-dose statin and lopressor   - d/c heparin gtt  - 2D echo with EF of 50%   - follow interventional cardiology's recs and expected day of PCI     Hypertension  - continue losartan 100mg daily       KIMO Alcaraz, PGY-2  [phone]

## 2017-06-06 NOTE — PROGRESS NOTES
Spoke with Emmanuelle with Hospital Medicine. Patient to be stepped down to the floor.      KIMO Alcaraz, PGY-2  [phone]

## 2017-06-06 NOTE — NURSING
Patient arrived to floor from [facility] via wheelchair.  Placed on cardiac monitoring. Dmitriy LEA to notify  of patient's arrival.  Vitals and admit assessment completed.  Patient and spouse oriented to room.  Will continue to monitor.

## 2017-06-06 NOTE — PLAN OF CARE
06/06/17 1256   Discharge Assessment   Assessment Type Discharge Planning Assessment   Confirmed/corrected address and phone number on facesheet? Yes   Assessment information obtained from? Medical Record;Patient   Expected Length of Stay (days) 2   Communicated expected length of stay with patient/caregiver yes   Prior to hospitilization cognitive status: Alert/Oriented   Prior to hospitalization functional status: Independent   Current cognitive status: Alert/Oriented   Current Functional Status: Independent   Arrived From acute care hospital  (Transfer form [facility] for CABG vs high risk PCI eval)   Lives With alone   Able to Return to Prior Arrangements yes   Is patient able to care for self after discharge? Yes   How many people do you have in your home that can help with your care after discharge? 1   Who are your caregiver(s) and their phone number(s)? SONNY Oliveira [phone]   Patient's perception of discharge disposition admitted as an inpatient   Readmission Within The Last 30 Days no previous admission in last 30 days   Patient currently being followed by outpatient case management? No   Patient currently receives home health services? No   Does the patient currently use HME? No   Patient currently receives private duty nursing? No   Patient currently receives any other outside agency services? No   Equipment Currently Used at Home none   Do you have any problems affording any of your prescribed medications? No   Is the patient taking medications as prescribed? yes   Do you have any financial concerns preventing you from receiving the healthcare you need? No   Does the patient have transportation to healthcare appointments? Yes   Transportation Available car;family or friend will provide   On Dialysis? No   Does the patient receive services at the Coumadin Clinic? No   Are there any open cases? No   Discharge Plan A Home with family;Rehab  (A referral to Outpatient Cardiac rehab should be made per  interventional cards)   Discharge Plan B Home with family   Patient/Family In Agreement With Plan yes      met with the patient and spouse in room 3092. The patient was completely independent and in good health prior to admission. He was a transfer from [facility] for evaluation for CABG vs high risk PCI. He has elected to proceed with the PCI. Verified his current insurance provider of [facility] and place a front and back copy of the card in the blue chart for scan to medical records. Also emailed admitting Mary Grace Heath in patient access/admitting a scanned copy of the insurance card so she may call in authorization to [facility]. [facility] to notify her of the update insurance information and to call asap for [facility] can send clinicals. Will fax clinicals to [facility]. No discharge needs are assessed at this time. He will be referred to Outpatient Cardiac Rehab upon discharge per Interventional Cardiology recommendations.

## 2017-06-06 NOTE — CONSULTS
Consult Note  Cardiothoracic Surgery    Consults       History of Present Illness:  Patient is a 61 y.o. male with PMH of hypertension presents as a transfer from [facility] with NSTEMI.     Patient presented to ED yesterday with chest pain for approximately 24 hours.  He reported associated diaphoresis. He was found to have an initial troponin of 0.6 and started on treatment for NSTE-ACS. EKG showed Q waves in the lateral leads and RBBB. He underwent LHC and was transferred to [facility] for possible PCI vs CABG.  Overnight, his troponin peaked at 24.  LHC showed LAD 95% stenosis and  of the LCx. Echo performed showed preserved EF but hypokinesis of the anterior wall.      Patient is currently chest pain free. Denies palpitations, lightheadedness, dizziness, syncope, LE edema.    Scheduled Meds:   aspirin  81 mg Oral Daily    atorvastatin  80 mg Oral Daily    clopidogrel  75 mg Oral Daily    sodium chloride 0.9%  3 mL Intravenous Q8H     Infusions/Drips:   heparin (porcine) in D5W 17 Units/kg/hr (06/06/17 0992)     PRN Meds:acetaminophen, hydrocodone-acetaminophen 5-325mg    Review of patient's allergies indicates:   Allergen Reactions    No known drug allergies        Past Medical History:   Diagnosis Date    Gout     Hypertension      Past Surgical History:   Procedure Laterality Date    skin graph       Family History   Problem Relation Age of Onset    Gout Father     Diabetes Father     Hypertension Mother      Social History   Substance Use Topics    Smoking status: Former Smoker    Smokeless tobacco: Not on file    Alcohol use 4.2 oz/week     7 Standard drinks or equivalent per week        Review of Systems:  Review of Systems   Constitutional: Negative for fever and malaise/fatigue.  Negative for change in weight  HENT: Negative for congestion and sore throat.    Eyes: Negative for change in vision  Respiratory: Negative for cough, shortness of breath and wheezing.    Cardiovascular: see  HPI  Gastrointestinal: Negative for abdominal pain, constipation, diarrhea, nausea and vomiting.   Genitourinary: Negative for dysuria, frequency and urgency.   Musculoskeletal: Negative for back pain and myalgias.   Skin: Negative for itching and rash.   Neurological: Negative for dizziness, speech change, seizures, weakness and headaches.   Endo/Heme/Allergies: Does not bruise/bleed easily.   Psychiatric/Behavioral: Negative for depression.  Negative for anxiety      OBJECTIVE:     Vital Signs (Most Recent)  Temp: 97.8 °F (36.6 °C) (06/06/17 0701)  Pulse: 62 (06/06/17 0900)  Resp: 18 (06/06/17 0900)  BP: 124/74 (06/06/17 0900)  SpO2: (!) 92 % (06/06/17 0900)    Admission Weight: 89 kg (196 lb 3.4 oz) (06/05/17 2100)   Most Recent Weight: 89 kg (196 lb 3.4 oz) (06/05/17 2100)    Vital Signs Range (Last 24H):  Temp:  [97.8 °F (36.6 °C)-98.5 °F (36.9 °C)]   Pulse:  [60-75]   Resp:  [16-39]   BP: ()/(55-84)   SpO2:  [91 %-98 %]     Physical Exam:  Physical Exam   Constitutional: He is oriented to person, place, and time. He appears well-developed and well-nourished.   HENT:   Head: Normocephalic and atraumatic.   Eyes: Pupils are equal, round, and reactive to light.   Neck: Normal range of motion. Neck supple.   Cardiovascular: Normal rate, regular rhythm and normal heart sounds.    Pulmonary/Chest: Effort normal and breath sounds normal.   Abdominal: Soft. Bowel sounds are normal.   Musculoskeletal: Normal range of motion.   Neurological: He is alert and oriented to person, place, and time.   Skin: Skin is warm and dry.   Psychiatric: He has a normal mood and affect. His behavior is normal.       Laboratory:  CBC:   Recent Labs  Lab 06/06/17  0500   WBC 8.00   RBC 4.09*   HGB 13.0*   HCT 38.4*      MCV 94   MCH 31.8*   MCHC 33.9     BMP:   Recent Labs  Lab 06/06/17  0500         K 3.9      CO2 23   BUN 14   CREATININE 1.0   CALCIUM 8.7   MG 1.9     LFTs:   Recent Labs  Lab  06/06/17  0500   ALT 38   *   ALKPHOS 59   BILITOT 0.9   PROT 6.2   ALBUMIN 3.5     Cardiac markers:   Recent Labs  Lab 06/04/17  1405   TROPONINI 0.663*       Diagnostic Results:  2D echo here ordered    [facility]: films are not at bedside    ASSESSMENT/PLAN:     61 year old male presents with NSTEMI and CAD.  CT surgery asked to see for possible CABG.  At this time, the patient prefers to proceed with PCI.  Please reconsult if situation changes or our assistance is needed.    CTS Attending Note:    I have personally taken the history and examined this patient and agree with the NP's note as stated above. I reviewed the films and spoke with the cardiology team. I think it is reasonable to consider bypass or PCI to the LAD. The circumflex is small and appears to be chronically occluded. I do not think this will be a good target for bypass. I will discuss this with the patient and the treatment team.

## 2017-06-06 NOTE — H&P
Cardiology History & Physical  Attending Physician: Lul Patel MD  Chief Complaint: NSTEMI, PCI     HPI:   61 y.o. gentleman with PMH of HTN that presents as a transfer from OSH for NSTEMI    Patient presented yesterday morning to OSH with chest pain x 1 day. He reports he had multiple episodes of crescendo, substernal chest pain starting at 10 am on 6/4. He had two more episodes which was 5/10 at its worst and presented to the ED at OSH at around 1350. Upon arrival, his pain had improved to 1/10. He reported associated diaphoresis. He was found to have an initial troponin of 0.6 and started on treatment for NSTE-ACS. EKG showed Q waves in the lateral leads and RBBB. He was transferred to OSH#2 for futher management and angiogram in the AM. Overnight, his troponin peaked at 24 and underwent LHC in the AM which showed mLAD 95% stenosis and  of the LCx. Echo performed showed preserved EF but hypokinesis of the anterior wall.     Patient is currently chest pain free. Denies palpitations, lightheadedness, dizziness, syncope, LE edema.     ROS:    Constitution: Negative for fever, chills, weight loss or gain.   HENT: Negative for sore throat, rhinorrhea, or headache.  Eyes: Negative for blurred or double vision.   Cardiovascular: See above  Pulmonary: Negative for SOB   Gastrointestinal: Negative for abdominal pain, nausea, vomiting, or diarrhea.   : Negative for dysuria.   Neurological: Negative for focal weakness or sensory changes.  PMH:     Past Medical History:   Diagnosis Date    Gout     Hypertension      Past Surgical History:   Procedure Laterality Date    skin graph       Allergies:     Review of patient's allergies indicates:   Allergen Reactions    No known drug allergies      Medications:     No current facility-administered medications on file prior to encounter.      Current Outpatient Prescriptions on File Prior to Encounter   Medication Sig Dispense Refill    hydrochlorothiazide  (HYDRODIURIL) 25 MG tablet Take 1 tablet (25 mg total) by mouth once daily. 90 tablet 0    losartan (COZAAR) 100 MG tablet TAKE 1 TABLET (100 MG TOTAL) BY MOUTH ONCE DAILY. 30 tablet 0    naproxen sodium (ANAPROX) 550 MG tablet Take 1 tablet (550 mg total) by mouth 2 (two) times daily as needed (Take with food.). 60 tablet 1       Inpatient Medications   Continuous Infusions:   [START ON 6/6/2017] heparin (porcine) in D5W 17 Units/kg/hr (06/05/17 2317)     Scheduled Meds:   [START ON 6/6/2017] aspirin  81 mg Oral Daily    [START ON 6/6/2017] atorvastatin  80 mg Oral Daily    [START ON 6/6/2017] sodium chloride 0.9%  3 mL Intravenous Q8H     PRN Meds:     Social History:     Social History   Substance Use Topics    Smoking status: Former Smoker    Smokeless tobacco: Not on file    Alcohol use 4.2 oz/week     7 Standard drinks or equivalent per week     Family History:     Family History   Problem Relation Age of Onset    Gout Father     Diabetes Father     Hypertension Mother      Physical Exam:     Vitals:  Temp:  [98.5 °F (36.9 °C)]   Pulse:  [67-68]   Resp:  [18-24]   BP: (124)/(76)   SpO2:  [91 %-94 %]  on RA I/O's:  No intake or output data in the 24 hours ending 06/05/17 2326   General: NAD  Neuro: CN II - XII intact. No weakness or sensory loss.   HEENT: Visual fields and EOM intact, NIK, no conjunctival injection, no pallor  Neck: No anterior or posterior lymphadenopathy. No JVD. No carotid bruits.   Cardiac: S1, S2, no murmurs, rubs, or gallops. Pulses 2+, regular.   Pulmonary: Clear to auscultation bilaterally.   Abdominal: Soft, non-tender. No mass or hepatosplenomegaly. No rebound or rigidity. Bowel sounds present.   Extremities: No clubbing, cyanosis, or edema.   Skin: No bruising or rash    Labs:       Recent Labs  Lab 06/04/17  1405 06/05/17  2230    137   K 3.9 3.9    105   CO2 18* 22*   BUN 23 18   CREATININE 1.3 1.2   ANIONGAP 15 10       Recent Labs  Lab 06/04/17  1405  06/05/17  2230   AST 29 170*   ALT 30 45*   ALKPHOS 71 62   BILITOT 0.7 0.8   ALBUMIN 4.7 3.7       Recent Labs  Lab 06/04/17  1405 06/05/17  2230   TROPONINI 0.663*  --    BNP  --  461*      Recent Labs  Lab 06/04/17  1405 06/05/17  2230   WBC 13.00* 8.69   HGB 14.7 13.5*   HCT 42.3 40.1    181   GRAN 75.7*  9.8* 76.1*  6.6       Recent Labs  Lab 06/05/17  2230   INR 1.0     No results found for: CHOL, HDL, LDLCALC, TRIG  No results found for: HGBA1C     Micro:  Blood Cultures  No results found for: LABBLOO  Urine Cultures  No results found for: LABURIN    Imaging:    CXR Pending     Echo ordered    EKG: NSR, RBBB, worsening Q waves in inferior and anterior leads    Telemetry: Sinus chris - NSR    Assessment:    61 y.o. gentleman with PMH of HTN that presents as a transfer from OSH for NSTEMI. Treated as NSTE-ACS with troponin that peaked at 24. Underwent LHC at OSH earlier today which showed 95% mLAD stenosis and  of LCx. Transferred to [facility] for evaluation of PCI vs CABG     Plan:   NSTEMI  - Continue medical management for NSTE-ACS, patient loaded and started on ASA, statin and lopressor at OSH, also currently on heparin gtt. Holding lopressor currently due to bradycardia.  - Will load with Plavix 600 mg x 1 then start 75 mg daily  - Will review cath films with interventional staff to determine best course of action for patient's CAD (CABG vs PCI)  - 2D echo w/ CFD in AM  - NPO now for possible LHC c PCI tomorrow    Hypertension  - Patient currently hemodynamically stable, BP controlled on own, will hold anti-hypertensives at this time    Discussed with on-call CCU staff, Dr. Patel    Signed:  Donato Spencer MD, MPH  Cardiovascular Disease Fellow, PGY-4  Pager: [phone]  6/5/2017 11:26 PM

## 2017-06-06 NOTE — PLAN OF CARE
Problem: Patient Care Overview  Goal: Plan of Care Review  Outcome: Ongoing (interventions implemented as appropriate)  Patient progressing towards stepdown to .  Patient had no acute events noted throughout the day at this time.  Patient required tylenol once for lower back pain.  No chest pain noted throughout the day.  Oxygen requirements have been minimal, but patient has stayed on 2 liters nasal cannula.  Heparin drip turned off earlier in the day, and plavix continued.  Blood pressure has ranged between 110-160 systolically.  Urine output adequate.  Plan for Cath Lab or Surgery tomorrow.  Will continue to monitor.

## 2017-06-07 ENCOUNTER — SURGERY (OUTPATIENT)
Age: 62
End: 2017-06-07

## 2017-06-07 DIAGNOSIS — Z95.5 S/P CORONARY ARTERY STENT PLACEMENT: ICD-10-CM

## 2017-06-07 DIAGNOSIS — I25.10 CORONARY ARTERY DISEASE INVOLVING NATIVE CORONARY ARTERY OF NATIVE HEART WITHOUT ANGINA PECTORIS: Primary | ICD-10-CM

## 2017-06-07 DIAGNOSIS — I21.4 NON-ST ELEVATION MYOCARDIAL INFARCTION (NSTEMI): Primary | ICD-10-CM

## 2017-06-07 PROBLEM — I10 ESSENTIAL HYPERTENSION: Status: ACTIVE | Noted: 2017-06-07

## 2017-06-07 LAB
ALBUMIN SERPL BCP-MCNC: 3.6 G/DL
ALP SERPL-CCNC: 65 U/L
ALT SERPL W/O P-5'-P-CCNC: 31 U/L
ANION GAP SERPL CALC-SCNC: 12 MMOL/L
AST SERPL-CCNC: 51 U/L
BASOPHILS # BLD AUTO: 0.01 K/UL
BASOPHILS NFR BLD: 0.1 %
BILIRUB SERPL-MCNC: 1.2 MG/DL
BUN SERPL-MCNC: 13 MG/DL
CALCIUM SERPL-MCNC: 9 MG/DL
CHLORIDE SERPL-SCNC: 105 MMOL/L
CO2 SERPL-SCNC: 23 MMOL/L
CORONARY STENOSIS: ABNORMAL
CORONARY STENT: YES
CREAT SERPL-MCNC: 1.2 MG/DL
DIASTOLIC DYSFUNCTION: YES
DIFFERENTIAL METHOD: ABNORMAL
EOSINOPHIL # BLD AUTO: 0.1 K/UL
EOSINOPHIL NFR BLD: 1 %
ERYTHROCYTE [DISTWIDTH] IN BLOOD BY AUTOMATED COUNT: 13.5 %
EST. GFR  (AFRICAN AMERICAN): >60 ML/MIN/1.73 M^2
EST. GFR  (NON AFRICAN AMERICAN): >60 ML/MIN/1.73 M^2
ESTIMATED PA SYSTOLIC PRESSURE: 36
ESTIMATED PA SYSTOLIC PRESSURE: 39.19
GLUCOSE SERPL-MCNC: 101 MG/DL
HCT VFR BLD AUTO: 38 %
HGB BLD-MCNC: 13.2 G/DL
LYMPHOCYTES # BLD AUTO: 1.2 K/UL
LYMPHOCYTES NFR BLD: 16.9 %
MAGNESIUM SERPL-MCNC: 1.7 MG/DL
MCH RBC QN AUTO: 32.1 PG
MCHC RBC AUTO-ENTMCNC: 34.7 %
MCV RBC AUTO: 93 FL
MITRAL VALVE REGURGITATION: ABNORMAL
MITRAL VALVE REGURGITATION: NORMAL
MONOCYTES # BLD AUTO: 0.7 K/UL
MONOCYTES NFR BLD: 9.7 %
NEUTROPHILS # BLD AUTO: 5.3 K/UL
NEUTROPHILS NFR BLD: 72.2 %
PLATELET # BLD AUTO: 170 K/UL
PLATELET RESPONSE PLAVIX: 115 PRU
PMV BLD AUTO: 10.2 FL
POTASSIUM SERPL-SCNC: 4 MMOL/L
PROT SERPL-MCNC: 6.7 G/DL
RBC # BLD AUTO: 4.11 M/UL
RETIRED EF AND QEF - SEE NOTES: 45 (ref 55–65)
RETIRED EF AND QEF - SEE NOTES: 50 (ref 55–65)
SODIUM SERPL-SCNC: 140 MMOL/L
TRICUSPID VALVE REGURGITATION: ABNORMAL
TRICUSPID VALVE REGURGITATION: NORMAL
TROPONIN I SERPL DL<=0.01 NG/ML-MCNC: 11.72 NG/ML
TROPONIN I SERPL DL<=0.01 NG/ML-MCNC: 15.23 NG/ML
WBC # BLD AUTO: 7.33 K/UL

## 2017-06-07 PROCEDURE — 25000003 PHARM REV CODE 250: Performed by: NURSE PRACTITIONER

## 2017-06-07 PROCEDURE — 25000003 PHARM REV CODE 250: Performed by: INTERNAL MEDICINE

## 2017-06-07 PROCEDURE — 93010 ELECTROCARDIOGRAM REPORT: CPT | Mod: 59,S$GLB,, | Performed by: INTERNAL MEDICINE

## 2017-06-07 PROCEDURE — 93454 CORONARY ARTERY ANGIO S&I: CPT | Mod: 26,59,, | Performed by: INTERNAL MEDICINE

## 2017-06-07 PROCEDURE — 63600175 PHARM REV CODE 636 W HCPCS: Performed by: NURSE PRACTITIONER

## 2017-06-07 PROCEDURE — 84484 ASSAY OF TROPONIN QUANT: CPT | Mod: 91

## 2017-06-07 PROCEDURE — 99152 MOD SED SAME PHYS/QHP 5/>YRS: CPT | Mod: ,,, | Performed by: INTERNAL MEDICINE

## 2017-06-07 PROCEDURE — 93306 TTE W/DOPPLER COMPLETE: CPT | Mod: 26,,, | Performed by: INTERNAL MEDICINE

## 2017-06-07 PROCEDURE — 83735 ASSAY OF MAGNESIUM: CPT

## 2017-06-07 PROCEDURE — 92928 PRQ TCAT PLMT NTRAC ST 1 LES: CPT | Mod: LD,,, | Performed by: INTERNAL MEDICINE

## 2017-06-07 PROCEDURE — 25000003 PHARM REV CODE 250

## 2017-06-07 PROCEDURE — B2111ZZ FLUOROSCOPY OF MULTIPLE CORONARY ARTERIES USING LOW OSMOLAR CONTRAST: ICD-10-PCS | Performed by: INTERNAL MEDICINE

## 2017-06-07 PROCEDURE — 63600175 PHARM REV CODE 636 W HCPCS

## 2017-06-07 PROCEDURE — C1887 CATHETER, GUIDING: HCPCS

## 2017-06-07 PROCEDURE — 84484 ASSAY OF TROPONIN QUANT: CPT

## 2017-06-07 PROCEDURE — 25000003 PHARM REV CODE 250: Performed by: STUDENT IN AN ORGANIZED HEALTH CARE EDUCATION/TRAINING PROGRAM

## 2017-06-07 PROCEDURE — 99233 SBSQ HOSP IP/OBS HIGH 50: CPT | Mod: ,,, | Performed by: NURSE PRACTITIONER

## 2017-06-07 PROCEDURE — 99152 MOD SED SAME PHYS/QHP 5/>YRS: CPT

## 2017-06-07 PROCEDURE — 36415 COLL VENOUS BLD VENIPUNCTURE: CPT

## 2017-06-07 PROCEDURE — 20600001 HC STEP DOWN PRIVATE ROOM

## 2017-06-07 PROCEDURE — 85576 BLOOD PLATELET AGGREGATION: CPT

## 2017-06-07 PROCEDURE — 99233 SBSQ HOSP IP/OBS HIGH 50: CPT | Mod: ,,, | Performed by: INTERNAL MEDICINE

## 2017-06-07 PROCEDURE — 93005 ELECTROCARDIOGRAM TRACING: CPT

## 2017-06-07 PROCEDURE — 027034Z DILATION OF CORONARY ARTERY, ONE ARTERY WITH DRUG-ELUTING INTRALUMINAL DEVICE, PERCUTANEOUS APPROACH: ICD-10-PCS | Performed by: INTERNAL MEDICINE

## 2017-06-07 PROCEDURE — 80053 COMPREHEN METABOLIC PANEL: CPT

## 2017-06-07 PROCEDURE — 93306 TTE W/DOPPLER COMPLETE: CPT

## 2017-06-07 PROCEDURE — 85025 COMPLETE CBC W/AUTO DIFF WBC: CPT

## 2017-06-07 RX ORDER — NITROGLYCERIN 0.4 MG/1
TABLET SUBLINGUAL
Status: COMPLETED
Start: 2017-06-07 | End: 2017-06-07

## 2017-06-07 RX ORDER — NITROGLYCERIN 20 MG/100ML
5 INJECTION INTRAVENOUS CONTINUOUS
Status: DISCONTINUED | OUTPATIENT
Start: 2017-06-07 | End: 2017-06-07

## 2017-06-07 RX ORDER — MORPHINE SULFATE 2 MG/ML
INJECTION, SOLUTION INTRAMUSCULAR; INTRAVENOUS
Status: COMPLETED
Start: 2017-06-07 | End: 2017-06-07

## 2017-06-07 RX ORDER — NITROGLYCERIN 0.4 MG/1
0.4 TABLET SUBLINGUAL EVERY 5 MIN PRN
Status: COMPLETED | OUTPATIENT
Start: 2017-06-07 | End: 2017-06-07

## 2017-06-07 RX ORDER — NITROGLYCERIN 0.4 MG/1
0.4 TABLET SUBLINGUAL EVERY 5 MIN PRN
Status: DISCONTINUED | OUTPATIENT
Start: 2017-06-07 | End: 2017-06-08 | Stop reason: HOSPADM

## 2017-06-07 RX ORDER — ONDANSETRON 2 MG/ML
INJECTION INTRAMUSCULAR; INTRAVENOUS
Status: COMPLETED
Start: 2017-06-07 | End: 2017-06-07

## 2017-06-07 RX ORDER — ONDANSETRON 2 MG/ML
4 INJECTION INTRAMUSCULAR; INTRAVENOUS ONCE
Status: COMPLETED | OUTPATIENT
Start: 2017-06-07 | End: 2017-06-07

## 2017-06-07 RX ORDER — MORPHINE SULFATE 2 MG/ML
1 INJECTION, SOLUTION INTRAMUSCULAR; INTRAVENOUS ONCE
Status: COMPLETED | OUTPATIENT
Start: 2017-06-07 | End: 2017-06-07

## 2017-06-07 RX ORDER — SODIUM CHLORIDE 9 MG/ML
3 INJECTION, SOLUTION INTRAVENOUS CONTINUOUS
Status: DISCONTINUED | OUTPATIENT
Start: 2017-06-07 | End: 2017-06-07

## 2017-06-07 RX ORDER — METOPROLOL TARTRATE 1 MG/ML
5 INJECTION, SOLUTION INTRAVENOUS ONCE
Status: COMPLETED | OUTPATIENT
Start: 2017-06-07 | End: 2017-06-07

## 2017-06-07 RX ADMIN — SODIUM CHLORIDE 3 ML/KG/HR: 0.9 INJECTION, SOLUTION INTRAVENOUS at 08:06

## 2017-06-07 RX ADMIN — METOPROLOL TARTRATE 25 MG: 25 TABLET ORAL at 10:06

## 2017-06-07 RX ADMIN — ASPIRIN 81 MG: 81 TABLET, COATED ORAL at 10:06

## 2017-06-07 RX ADMIN — NITROGLYCERIN 0.4 MG: 0.4 TABLET SUBLINGUAL at 05:06

## 2017-06-07 RX ADMIN — LOSARTAN POTASSIUM 100 MG: 50 TABLET, FILM COATED ORAL at 10:06

## 2017-06-07 RX ADMIN — CLOPIDOGREL 75 MG: 75 TABLET, FILM COATED ORAL at 10:06

## 2017-06-07 RX ADMIN — ONDANSETRON 4 MG: 2 INJECTION INTRAMUSCULAR; INTRAVENOUS at 08:06

## 2017-06-07 RX ADMIN — MAGNESIUM SULFATE HEPTAHYDRATE 3 G: 500 INJECTION, SOLUTION INTRAMUSCULAR; INTRAVENOUS at 10:06

## 2017-06-07 RX ADMIN — METOPROLOL TARTRATE 25 MG: 25 TABLET ORAL at 09:06

## 2017-06-07 RX ADMIN — Medication 3 ML: at 06:06

## 2017-06-07 RX ADMIN — ATORVASTATIN CALCIUM 80 MG: 20 TABLET, FILM COATED ORAL at 10:06

## 2017-06-07 RX ADMIN — METOPROLOL TARTRATE 5 MG: 5 INJECTION INTRAVENOUS at 06:06

## 2017-06-07 RX ADMIN — MORPHINE SULFATE 1 MG: 2 INJECTION, SOLUTION INTRAMUSCULAR; INTRAVENOUS at 08:06

## 2017-06-07 RX ADMIN — Medication 3 ML: at 09:06

## 2017-06-07 RX ADMIN — NITROGLYCERIN 0.4 MG: 0.4 TABLET SUBLINGUAL at 06:06

## 2017-06-07 RX ADMIN — HYDROCODONE BITARTRATE AND ACETAMINOPHEN 1 TABLET: 5; 325 TABLET ORAL at 11:06

## 2017-06-07 NOTE — CONSULTS
"Inpatient consult to Cardiac Rehab  Consult performed by: ACE MORRIS  Consult ordered by: MISHA GILMAN  Assessment/Recommendations: Cardiac Rehab     Vidal Haddad   [id number]   6/7/2017       Activity taught: Yes    Cardiac Rehab Phase Taught: Phase 1 & 2    Risk Factors-Modifiable: nutrition, hypertension    Risk Factors-Non modifiable: age, heredity, gender    Teaching Method: Verbal, Written, Living with Heart Disease    Understanding: Yes    Response: Verbalized understanding and questions answered. He lives in [place], but works in [place]. Looking at time schedules, he thinks the program at [facility] will work best for him with his work schedule.    Comments: S/P NSTEMI and coronary stent. Discussed cardiac rehab and risk factor modification. Team to refer patient to [facility] Cardiac Rehab Phase II. Educational materials were used in the process and given to the patient. They included "Your Guide to Living with Heart Disease", Phase Two Cardiac Rehabilitation information along with a sample Mediterranean diet.The patient expressed understanding of the teaching and expressed desire to take a role in modifying the risk factors when they return home.          "

## 2017-06-07 NOTE — NURSING
Pt received Nitro SL x3 over 15 minute for chest pain. Pt rated chest pain at 2/10.  notified and orders to give one more Nitro tab. Will continue to monitor.

## 2017-06-07 NOTE — HPI
Mr. Haddad is a 61 y.o. gentleman with past medical history of HTN that presents as a transfer from OSH for NSTEMI. He presented yesterday morning to OSH with chest pain x 1 day. He reports he had multiple episodes of crescendo, substernal chest pain starting at 10 am on 6/4. He had two more episodes which was 5/10 at its worst and presented to the ED at OSH at around 1350. Upon arrival, his pain had improved to 1/10. He reported associated diaphoresis. He was found to have an initial troponin of 0.6 and started on treatment for NSTE-ACS. EKG showed Q waves in the lateral leads and RBBB. He was then transferred to OSH #2 for futher management and angiogram in the AM, however overnight, his troponin peaked at 24 and underwent LHC 6/5 showed mLAD 95% stenosis and  of the LCx. Echo performed showed preserved EF but hypokinesis of the anterior wall. He was then transferred to [facility] for CTS evaluation.

## 2017-06-07 NOTE — PROGRESS NOTES
[facility] Hospital Medicine  Progress Note    Patient Name: Vidal Haddad  MRN: [id number]  Patient Class: IP- Inpatient   Admission Date: 6/5/2017  Length of Stay: 2 days  Attending Physician: Francine Bob MD  Primary Care Provider: Jefe Gongora DO    [facility] Medicine Team: [facility] MED J Vikki Foster NP    Subjective:     Principal Problem:NSTEMI (non-ST elevated myocardial infarction)    HPI:  Mr. Haddad is a 61 y.o. gentleman with  past medical history of HTN that presents as a transfer from [facility] for NSTEMI. He presented yesterday morning to OSH with chest pain x 1 day. He reports he had multiple episodes of crescendo, substernal chest pain starting at 10 am on 6/4. He had two more episodes which was 5/10 at its worst and presented to the ED at OSH at around 1350. Upon arrival, his pain had improved to 1/10. He reported associated diaphoresis. He was found to have an initial troponin of 0.6 and started on treatment for NSTE-ACS. EKG showed Q waves in the lateral leads and RBBB. He was then transferred to OSH #2 for futher management and angiogram in the AM, however overnight, his troponin peaked at 24 and underwent C 6/5 showed mLAD 95% stenosis and  of the LCx. Echo performed showed preserved EF but hypokinesis of the anterior wall. He was then transferred to [facility] for CTS evaluation.     Hospital Course:  Ms. Haddad was admitted from [facility] on 6/5 for NSTEMI s/p LHC revealing 95% stenosis of mLAD and  of the LCx. He was evaluation on 6/6 by CTS and patient preference was to proceed with PCI versus CABG. He was then stepped down to Hospital Medicine co-managed service. 6/6 at approximately 5 AM he began having chest pain with associated diaphoresis, his troponin significantly elevated and cath team was activated, he underwent JANET to LAD. He is now chest pain free. Will continue to monitor, repeat 2D echo pending.     Interval History: Resting in bed, post  angiogram. Denies any chest pain, shortness of breath, or palpitations, no N/V, or diaphoresis. Endorses some chest soreness, however reports it is not similar to pain that prompted ER visits or similar to pain overnight.     Review of Systems   Constitutional: Negative for activity change, appetite change, chills, diaphoresis, fatigue and unexpected weight change.   Respiratory: Negative for cough, chest tightness, shortness of breath and wheezing.    Cardiovascular: Negative for chest pain, palpitations and leg swelling.   Gastrointestinal: Negative for abdominal distention, abdominal pain, nausea and vomiting.   Musculoskeletal: Positive for back pain.   Neurological: Negative for dizziness, weakness, light-headedness and headaches.        Objective:     Vital Signs (Most Recent):  Temp: 98.2 °F (36.8 °C) (06/07/17 0830)  Pulse: (!) 54 (06/07/17 1330)  Resp: 15 (06/07/17 1330)  BP: (!) 139/95 (06/07/17 1330)  SpO2: (!) 93 % (06/07/17 1330) Vital Signs (24h Range):  Temp:  [97.5 °F (36.4 °C)-98.3 °F (36.8 °C)] 98.2 °F (36.8 °C)  Pulse:  [54-87] 54  Resp:  [12-23] 15  SpO2:  [88 %-99 %] 93 %  BP: (112-165)/() 139/95     Weight: 89 kg (196 lb 3.4 oz)  Body mass index is 28.98 kg/m².    Intake/Output Summary (Last 24 hours) at 06/07/17 1355  Last data filed at 06/07/17 0600   Gross per 24 hour   Intake              540 ml   Output              325 ml   Net              215 ml      Physical Exam   Constitutional: He is oriented to person, place, and time. He appears well-developed and well-nourished. No distress.   Neck: No JVD present.   Cardiovascular: Normal rate, regular rhythm, normal heart sounds and intact distal pulses.  Exam reveals no gallop and no friction rub.    No murmur heard.  Pulmonary/Chest: Effort normal and breath sounds normal. No respiratory distress.   Abdominal: Soft. Bowel sounds are normal. He exhibits no distension.   Musculoskeletal: He exhibits no edema.   Neurological: He is alert  and oriented to person, place, and time.   Skin: Skin is warm and dry. Capillary refill takes less than 2 seconds. He is not diaphoretic.   Nursing note and vitals reviewed.    Significant Labs:   CBC:   Recent Labs  Lab 06/05/17 2230 06/06/17  0500 06/07/17  0530   WBC 8.69 8.00 7.33   HGB 13.5* 13.0* 13.2*   HCT 40.1 38.4* 38.0*    173 170     CMP:   Recent Labs  Lab 06/05/17 2230 06/06/17  0500 06/07/17  0530    136 140   K 3.9 3.9 4.0    104 105   CO2 22* 23 23    104 101   BUN 18 14 13   CREATININE 1.2 1.0 1.2   CALCIUM 8.7 8.7 9.0   PROT 6.6 6.2 6.7   ALBUMIN 3.7 3.5 3.6   BILITOT 0.8 0.9 1.2*   ALKPHOS 62 59 65   * 119* 51*   ALT 45* 38 31   ANIONGAP 10 9 12   EGFRNONAA >60.0 >60.0 >60.0     Cardiac Markers:   Recent Labs  Lab 06/05/17  2230   *     Lipid Panel:   Recent Labs  Lab 06/05/17  2230   CHOL 185   HDL 33*   LDLCALC 119.8   TRIG 161*   CHOLHDL 17.8*     Troponin:   Recent Labs  Lab 06/07/17  0530 06/07/17  0937   TROPONINI 15.227* 11.723*     All pertinent labs within the past 24 hours have been reviewed.    Significant Imaging: I have reviewed all pertinent imaging results/findings within the past 24 hours.    Assessment/Plan:      * NSTEMI (non-ST elevated myocardial infarction)    -s/p chest pain episode at 5 am with increased troponin and ST changes  -[facility] 6/6 with JANET to LAD, now chest pain free  -awaiting interventional input regarding  of LCx  -, will continue plavix  -continue statin, asa, metoprolol and losartan  -SL NTG and EKG PRN chest pain  -continue tele monitoring   -will need cardiology follow up as outpt, does not have established cardiologist         Coronary artery disease involving native coronary artery of native heart    -see above        Essential hypertension    -BP stable  -continue losartan and metoprolol           VTE Risk Mitigation         Ordered     Medium Risk of VTE  Once      06/05/17 8406          Vikki ASIF  DIANE Foster  Department of Hospital Medicine   [facility]  Pager [phone]

## 2017-06-07 NOTE — PROGRESS NOTES
Called by nurse who reported that Mr Haddad is having CP.  I immediately went to evaluate him.      Site: central, Onset: 5am woke him from sleep, Character: pressure, Radiation: none, Associations: none, Timing: xx, Exacerbating/Relieving factors: none, Severity: 3/10 at worst, similar to NSTEMI that he had at previous hospital    I ordered EKG, troponins, SL nitrox4.    EKG showed q waves and possible ST elevation V3,V4.  Uncertain if these were true elevations so I called on-call Cardiology fellow, Bessy.  She looked at EKG and saw the patient.    Still having slight pain, now 1/10, after 4th nitro, so may need nitro drip.    Bessy is calling the attending and interventional cardiology.  CTS following pt too.  She will follow up with me she speaks with them.

## 2017-06-07 NOTE — CONSULTS
Consult Note  Interventional Cardiology    Consults       History of Present Illness:  Patient is a 61 y.o. male with PMH of hypertension presents as a transfer from [facility] with NSTEMI.  He has known 95% LAD,  of LCx, echo with normal EF but anterior wall hypokinesis.  Called bc chest pain not relieved with nitro, and now ongoing CP.  EKG with transient anterior ST elevations.         Scheduled Meds:   aspirin  81 mg Oral Daily    atorvastatin  80 mg Oral Daily    clopidogrel  75 mg Oral Daily    losartan  100 mg Oral Daily    metoprolol tartrate  25 mg Oral BID    sodium chloride 0.9%  3 mL Intravenous Q8H     Infusions/Drips:   sodium chloride 0.9%      nitroGLYCERIN       PRN Meds:acetaminophen, hydrocodone-acetaminophen 5-325mg, nitroGLYCERIN    Review of patient's allergies indicates:   Allergen Reactions    No known drug allergies        Past Medical History:   Diagnosis Date    Gout     Hypertension      Past Surgical History:   Procedure Laterality Date    skin graph       Family History   Problem Relation Age of Onset    Gout Father     Diabetes Father     Hypertension Mother      Social History   Substance Use Topics    Smoking status: Former Smoker    Smokeless tobacco: Not on file    Alcohol use 4.2 oz/week     7 Standard drinks or equivalent per week        Review of Systems:  Review of Systems   Constitutional: Negative for fever and malaise/fatigue.  Negative for change in weight  HENT: Negative for congestion and sore throat.    Eyes: Negative for change in vision  Respiratory: Negative for cough, shortness of breath and wheezing.    Cardiovascular: see HPI  Gastrointestinal: Negative for abdominal pain, constipation, diarrhea, nausea and vomiting.   Genitourinary: Negative for dysuria, frequency and urgency.   Musculoskeletal: Negative for back pain and myalgias.   Skin: Negative for itching and rash.   Neurological: Negative for dizziness, speech change, seizures,  weakness and headaches.   Endo/Heme/Allergies: Does not bruise/bleed easily.   Psychiatric/Behavioral: Negative for depression.  Negative for anxiety      OBJECTIVE:     Vital Signs (Most Recent)  Temp: 98.3 °F (36.8 °C) (06/06/17 1930)  Pulse: 62 (06/07/17 0630)  Resp: 20 (06/07/17 0630)  BP: 115/70 (06/07/17 0630)  SpO2: (!) 90 % (06/07/17 0630)    Admission Weight: 89 kg (196 lb 3.4 oz) (06/05/17 2100)   Most Recent Weight: 89 kg (196 lb 3.4 oz) (06/05/17 2100)    Vital Signs Range (Last 24H):  Temp:  [97.5 °F (36.4 °C)-98.3 °F (36.8 °C)]   Pulse:  [55-87]   Resp:  [13-26]   BP: (108-165)/()   SpO2:  [88 %-98 %]     Physical Exam:  Physical Exam   Constitutional: He is oriented to person, place, and time. He appears well-developed and well-nourished.   HENT:   Head: Normocephalic and atraumatic.   Eyes: Pupils are equal, round, and reactive to light.   Neck: Normal range of motion. Neck supple.   Cardiovascular: Normal rate, regular rhythm and normal heart sounds.    Pulmonary/Chest: Effort normal and breath sounds normal.   Abdominal: Soft. Bowel sounds are normal.   Musculoskeletal: Normal range of motion.   Neurological: He is alert and oriented to person, place, and time.   Skin: Skin is warm and dry.   Psychiatric: He has a normal mood and affect. His behavior is normal.       Laboratory:  CBC:     Recent Labs  Lab 06/07/17 0530   WBC 7.33   RBC 4.11*   HGB 13.2*   HCT 38.0*      MCV 93   MCH 32.1*   MCHC 34.7     BMP:     Recent Labs  Lab 06/07/17 0530         K 4.0      CO2 23   BUN 13   CREATININE 1.2   CALCIUM 9.0   MG 1.7     LFTs:     Recent Labs  Lab 06/07/17 0530   ALT 31   AST 51*   ALKPHOS 65   BILITOT 1.2*   PROT 6.7   ALBUMIN 3.6     Cardiac markers:     Recent Labs  Lab 06/07/17  0530   TROPONINI 15.227*       Diagnostic Results:  2D echo here ordered    LHC: films are not at bedside    ASSESSMENT/PLAN:     LHC via rleft CFA access.  Possible IABP  NSTEMI      Gretel Murray MD

## 2017-06-07 NOTE — PLAN OF CARE
Problem: Pain, Acute (Adult)  Goal: Acceptable Pain Control/Comfort Level  Patient will demonstrate the desired outcomes by discharge/transition of care.  Pt chest pain unrelieved after Nitro SL x4. Will continue to monitor.

## 2017-06-07 NOTE — PROGRESS NOTES
Patient developed chest pain this AM at around 5am. It was crushing chest pain and he received NTG x4 while also obtaining an EKG. I was called closer to 6:30 in regards to the chest pain that still remained at 1/10 in severity after the 4 NTG. EKG identified anterior ST elevations. I spoke with Dr. Murray, and the decision was made to activate the cath lab. Consents were obtained and I discussed the plan with the patient and his wife.    Bessy Frias MD  Cardiology PGY4

## 2017-06-07 NOTE — NURSING
Pt describes pain as pressure to chest and rates it 1/10 after receiving 4th nitro. Also pt placed on 2L O2 for O2 sats  dropping to 87%. Pt return to 93% after O2 applied.  notified. MD stated she is calling cardiology, no additional orders at this time. Will continue to monitor.

## 2017-06-07 NOTE — NURSING
Pt c/o chest pain. Prn pain med ordered but no orders for Nitro SL. Pt VSS.  notified, she stated she will come to bedside to assess patient.

## 2017-06-07 NOTE — HOSPITAL COURSE
Ms. Haddad was admitted from [place] on 6/5 for NSTEMI s/p LHC revealing 95% stenosis of mLAD and  of the LCx. He was evaluated on 6/6 by CTS and patient preference was to proceed with PCI versus CABG. He was then stepped down to Hospital Medicine co-managed service. 6/6 at approximately 5 AM he began having chest pain with associated diaphoresis, his troponin significantly elevated and cath team was activated, he underwent JANET to LAD. He remained chest pain free and is ready for discharge. Cardiac rehab has been arranged at [facility] and he has follow up at [facility].

## 2017-06-07 NOTE — PLAN OF CARE
Problem: Patient Care Overview  Goal: Plan of Care Review  Pt verbalizes no complaints. Denies CP, SOB, or other discomforts. Pt went to [facility] this AM emergently. LAD stented. Plan is for possible DC tomorrow.   Pt remains free of fall or injury. Pt verbalizes understanding of plan of care. Will continue to monitor.

## 2017-06-07 NOTE — ASSESSMENT & PLAN NOTE
-s/p chest pain episode at 5 am with increased troponin and ST changes  -[facility] 6/6 with JANET to LAD, now chest pain free  -awaiting interventional input regarding  of LCx  -, will continue plavix  -continue statin, asa, metoprolol and losartan  -SL NTG and EKG PRN chest pain  -continue tele monitoring   -will need cardiology follow up as outpt, does not have established cardiologist

## 2017-06-07 NOTE — NURSING
Dr. Frias to bedside. Cath lab activated. MD wants to start Tridil gtt. ICU nurse and House Supervisor Dominick Dye called to bedside per MD to monitor Tridil gtt. Nitro SL x1 and 5 mg IV Metoprolol given per CSU nurse. MD speaking with pharmacy to have Tridil gtt sent to floor. Cath lab to notify when they are ready for pt. Will continue to monitor.

## 2017-06-07 NOTE — NURSING
Nitro Sl x1 given for chest pain rated 3/10. Immediately after taking nitro pt rated chest pain 2/10 but after 5 min he stated pain returned to a 3/10. /100 when nitro given, will continue to monitor.

## 2017-06-07 NOTE — SIGNIFICANT EVENT
Cath lab ready. Tridil gtt not ready from pharmacy. Plan to start pt on gtt in cath lab. Pt transferred per charge nurse, primary care nurse and ICU nurse. Visi, fitbit and telemetry removed from pt prior to exiting room. Pt placed on portable monitor to observe heart rate and rhythm during transport. Pt on nasal cannula O2 @ 2L placed on portable O2 tank for transfer. Pt remains awake, alert and oriented. MD consents signed at bedside prior to transfer. Wife at bedside escorted to cath lab waiting room. Pt transferred to cath lab.

## 2017-06-07 NOTE — PROGRESS NOTES
POST CATH NOTE    Procedure:  [facility]  Referring MD:  Dr Teague  Indication:  Unstable Angina  Access:  L CFA    Findings:  95% mid LAD stenosis   of LCx  Right to left collaterals      Intervention:  3.5x12 mm and 3.5x28 mm JANET to prox and mid LAD    Patient tolerated the procedure well, no complications. Hemostasis done with perclose device.    Post Cath Exam:  Vitals:    06/07/17 0630   BP: 115/70   Pulse: 62   Resp: 20   Temp:      No unusual pain, hematoma or thrill at vascular access site.  Distal pulse present without signs of ischemia.    Post-procedure orders as per EPIC    Recommendation:  -continue dual antiplatelet therapy daily uninterrupted for at least one year  -high-potency statin, ACE-I and BB therapy  -vascular risk factor control  -cardiac rehab referral  -check PRU    Zurdo Floyd MD  Cardiology Fellow, PGY V  Pager: [phone]  6/7/2017 7:54 AM

## 2017-06-07 NOTE — SUBJECTIVE & OBJECTIVE
Interval History: Resting in bed, post angiogram. Denies any chest pain, shortness of breath, or palpitations, no N/V, or diaphoresis. Endorses some chest soreness, however reports it is not similar to pain that prompted ER visits or similar to pain overnight.     Review of Systems   Constitutional: Negative for activity change, appetite change, chills, diaphoresis, fatigue and unexpected weight change.   Respiratory: Negative for cough, chest tightness, shortness of breath and wheezing.    Cardiovascular: Negative for chest pain, palpitations and leg swelling.   Gastrointestinal: Negative for abdominal distention, abdominal pain, nausea and vomiting.   Musculoskeletal: Positive for back pain.   Neurological: Negative for dizziness, weakness, light-headedness and headaches.        Objective:     Vital Signs (Most Recent):  Temp: 98.2 °F (36.8 °C) (06/07/17 0830)  Pulse: (!) 54 (06/07/17 1330)  Resp: 15 (06/07/17 1330)  BP: (!) 139/95 (06/07/17 1330)  SpO2: (!) 93 % (06/07/17 1330) Vital Signs (24h Range):  Temp:  [97.5 °F (36.4 °C)-98.3 °F (36.8 °C)] 98.2 °F (36.8 °C)  Pulse:  [54-87] 54  Resp:  [12-23] 15  SpO2:  [88 %-99 %] 93 %  BP: (112-165)/() 139/95     Weight: 89 kg (196 lb 3.4 oz)  Body mass index is 28.98 kg/m².    Intake/Output Summary (Last 24 hours) at 06/07/17 1355  Last data filed at 06/07/17 0600   Gross per 24 hour   Intake              540 ml   Output              325 ml   Net              215 ml      Physical Exam   Constitutional: He is oriented to person, place, and time. He appears well-developed and well-nourished. No distress.   Neck: No JVD present.   Cardiovascular: Normal rate, regular rhythm, normal heart sounds and intact distal pulses.  Exam reveals no gallop and no friction rub.    No murmur heard.  Pulmonary/Chest: Effort normal and breath sounds normal. No respiratory distress.   Abdominal: Soft. Bowel sounds are normal. He exhibits no distension.   Musculoskeletal: He exhibits  no edema.   Neurological: He is alert and oriented to person, place, and time.   Skin: Skin is warm and dry. Capillary refill takes less than 2 seconds. He is not diaphoretic.   Nursing note and vitals reviewed.    Significant Labs:   CBC:   Recent Labs  Lab 06/05/17 2230 06/06/17  0500 06/07/17  0530   WBC 8.69 8.00 7.33   HGB 13.5* 13.0* 13.2*   HCT 40.1 38.4* 38.0*    173 170     CMP:   Recent Labs  Lab 06/05/17 2230 06/06/17  0500 06/07/17  0530    136 140   K 3.9 3.9 4.0    104 105   CO2 22* 23 23    104 101   BUN 18 14 13   CREATININE 1.2 1.0 1.2   CALCIUM 8.7 8.7 9.0   PROT 6.6 6.2 6.7   ALBUMIN 3.7 3.5 3.6   BILITOT 0.8 0.9 1.2*   ALKPHOS 62 59 65   * 119* 51*   ALT 45* 38 31   ANIONGAP 10 9 12   EGFRNONAA >60.0 >60.0 >60.0     Cardiac Markers:   Recent Labs  Lab 06/05/17  2230   *     Lipid Panel:   Recent Labs  Lab 06/05/17  2230   CHOL 185   HDL 33*   LDLCALC 119.8   TRIG 161*   CHOLHDL 17.8*     Troponin:   Recent Labs  Lab 06/07/17  0530 06/07/17  0937   TROPONINI 15.227* 11.723*     All pertinent labs within the past 24 hours have been reviewed.    Significant Imaging: I have reviewed all pertinent imaging results/findings within the past 24 hours.

## 2017-06-07 NOTE — PROGRESS NOTES
[facility]  Cardiology  Progress Note    Patient Name: Vidal Haddad  MRN: [id number]  Admission Date: 6/5/2017  Hospital Length of Stay: 2 days  Code Status: Full Code   Attending Physician: Francine Bob MD   Primary Care Physician: Jefe Gongora DO  Expected Discharge Date: 6/7/2017  Principal Problem:NSTEMI (non-ST elevated myocardial infarction)    Subjective:     Hospital Course:   61 year old male with hx of HTN presented to [facility] on 6/4/2017 with chest pain found to have NSTEMI. Had LHC on 6/5/2017 that showed LAD 95% with  of LCx and a trop of 24. Patient transferred to [facility] for PCI and CABG evaluation.      Interval History:   Patient developed chest pain acutely overnight. ST elevations were noted on EKG and patient was taken to cath. Had JANET placed in LAD to relieve obstruction. Noted to have  of left circumflex, but no intervention at this time. Chest pain resolved post procedure.    ROS   Constitutional: no fever or chills  ENT: no nasal congestion or sore throat  Respiratory: no cough or shortness of breath  Cardiovascular: Positive for chest pain. Negative for palpitations  Gastrointestinal: no nausea or vomiting, no abdominal pain or abdominal distention   Genitourinary: no hematuria or dysuria  Integument/Breast: no rash or pruritis  Hematologic/Lymphatic: no easy bruising or lymphadenopathy  Musculoskeletal: no arthralgias or myalgias  Neurological: no seizures or tremors  Endocrine: no heat or cold intolerance    Objective:     Vital Signs (Most Recent):  Temp: 98.2 °F (36.8 °C) (06/07/17 0830)  Pulse: 66 (06/07/17 1100)  Resp: 12 (06/07/17 0854)  BP: (!) 135/93 (06/07/17 0854)  SpO2: (!) 94 % (06/07/17 0854) Vital Signs (24h Range):  Temp:  [97.5 °F (36.4 °C)-98.3 °F (36.8 °C)] 98.2 °F (36.8 °C)  Pulse:  [55-87] 66  Resp:  [12-23] 12  SpO2:  [88 %-99 %] 94 %  BP: (108-165)/() 135/93     Weight: 89 kg (196 lb 3.4 oz)  Body mass index is 28.98  kg/m².    SpO2: (!) 94 %  O2 Device (Oxygen Therapy): room air      Intake/Output Summary (Last 24 hours) at 06/07/17 1133  Last data filed at 06/07/17 0600   Gross per 24 hour   Intake              540 ml   Output              575 ml   Net              -35 ml       Lines/Drains/Airways     Peripheral Intravenous Line                 Peripheral IV - Single Lumen 06/04/17 1353 Left Hand 2 days         Peripheral IV - Single Lumen 06/04/17 1354 Right Hand 2 days                Physical Exam    General: well developed, well nourished, appears stated age, no distress, pale  Eyes: conjunctivae/corneas clear. PERRL..  Neck: supple, symmetrical, trachea midline, no JVD  Cardiovascular: Heart: regular rate and rhythm, S1, S2 normal, no murmur, click, rub or gallop.  Lungs: clear to auscultation bilaterally and normal respiratory effort  Chest Wall: no tenderness  Abdomen/Rectal: Abdomen: Non distended. + BS. No masses. No TTP. No rebound or guarding. Negative Dang's sign. Rectal: not examined  Extremities: no edema, redness or tenderness in the calves or thighs. Pulses: 2+ and symmetric  Skin: Skin color, texture, turgor normal. No rashes or lesions  Musculoskeletal: full range of motion of joints  Lymph Nodes: No cervical or supraclavicular adenopathy  Psych/Behavioral: Normal. Alert and oriented, appropriate affect.    Significant Labs:   Recent Results (from the past 24 hour(s))   Comprehensive metabolic panel    Collection Time: 06/07/17  5:30 AM   Result Value Ref Range    Sodium 140 136 - 145 mmol/L    Potassium 4.0 3.5 - 5.1 mmol/L    Chloride 105 95 - 110 mmol/L    CO2 23 23 - 29 mmol/L    Glucose 101 70 - 110 mg/dL    BUN, Bld 13 8 - 23 mg/dL    Creatinine 1.2 0.5 - 1.4 mg/dL    Calcium 9.0 8.7 - 10.5 mg/dL    Total Protein 6.7 6.0 - 8.4 g/dL    Albumin 3.6 3.5 - 5.2 g/dL    Total Bilirubin 1.2 (H) 0.1 - 1.0 mg/dL    Alkaline Phosphatase 65 55 - 135 U/L    AST 51 (H) 10 - 40 U/L    ALT 31 10 - 44 U/L    Anion Gap  12 8 - 16 mmol/L    eGFR if African American >60.0 >60 mL/min/1.73 m^2    eGFR if non African American >60.0 >60 mL/min/1.73 m^2   CBC auto differential    Collection Time: 06/07/17  5:30 AM   Result Value Ref Range    WBC 7.33 3.90 - 12.70 K/uL    RBC 4.11 (L) 4.60 - 6.20 M/uL    Hemoglobin 13.2 (L) 14.0 - 18.0 g/dL    Hematocrit 38.0 (L) 40.0 - 54.0 %    MCV 93 82 - 98 fL    MCH 32.1 (H) 27.0 - 31.0 pg    MCHC 34.7 32.0 - 36.0 %    RDW 13.5 11.5 - 14.5 %    Platelets 170 150 - 350 K/uL    MPV 10.2 9.2 - 12.9 fL    Gran # 5.3 1.8 - 7.7 K/uL    Lymph # 1.2 1.0 - 4.8 K/uL    Mono # 0.7 0.3 - 1.0 K/uL    Eos # 0.1 0.0 - 0.5 K/uL    Baso # 0.01 0.00 - 0.20 K/uL    Gran% 72.2 38.0 - 73.0 %    Lymph% 16.9 (L) 18.0 - 48.0 %    Mono% 9.7 4.0 - 15.0 %    Eosinophil% 1.0 0.0 - 8.0 %    Basophil% 0.1 0.0 - 1.9 %    Differential Method Automated    Magnesium    Collection Time: 06/07/17  5:30 AM   Result Value Ref Range    Magnesium 1.7 1.6 - 2.6 mg/dL   Troponin I    Collection Time: 06/07/17  5:30 AM   Result Value Ref Range    Troponin I 15.227 (H) 0.000 - 0.026 ng/mL   Cath lab procedure    Collection Time: 06/07/17  7:00 AM   Result Value Ref Range    Coronary Stenosis >= 50% (A)     Coronary Stent Yes (A)    Platelet Response Plavix    Collection Time: 06/07/17  9:37 AM   Result Value Ref Range    Platelet Response Plavix 115 (L) 194 - 418 PRU   Troponin I    Collection Time: 06/07/17  9:37 AM   Result Value Ref Range    Troponin I 11.723 (H) 0.000 - 0.026 ng/mL       Assessment and Plan:       Active Diagnoses:    Diagnosis Date Noted POA    PRINCIPAL PROBLEM:  NSTEMI (non-ST elevated myocardial infarction) [I21.4] 06/05/2017 Yes    Coronary artery disease involving native coronary artery of native heart [I25.10] 06/06/2017 Yes      Problems Resolved During this Admission:    Diagnosis Date Noted Date Resolved POA     NSTEMI  - LHC from OSH on 6/5/2017 revealing LAD 95% stenosis and  of LCx  - Continue medical  management for NSTE-ACS, patient loaded and started on ASA, high-dose statin and lopressor at OSH   - currently on heparin gtt.   - loaded with Plavix 600 mg x currently on 75 mg po daily  - patient taken to cath lab acute this morning with ST elevations in anterior leads on EKG as well as new chest pain. Had JANET placed in LAD.  - Will need one year of DAPT, continue losartan and high dose statin. Continue beta blocker.  - Will repeat ECHO later today since patient has re-infarcted to monitor current EF.  - Will modify management based on findings of this study     Diet: cardiac  Code: full    Case and plan to be discussed with Dr. Antoine      VTE Risk Mitigation         Ordered     Medium Risk of VTE  Once      06/05/17 7437          González Mcallister MD  Cardiology  [facility]

## 2017-06-08 VITALS
RESPIRATION RATE: 14 BRPM | BODY MASS INDEX: 29.06 KG/M2 | OXYGEN SATURATION: 95 % | HEART RATE: 74 BPM | SYSTOLIC BLOOD PRESSURE: 129 MMHG | WEIGHT: 196.19 LBS | TEMPERATURE: 98 F | HEIGHT: 69 IN | DIASTOLIC BLOOD PRESSURE: 86 MMHG

## 2017-06-08 LAB
ALBUMIN SERPL BCP-MCNC: 3.4 G/DL
ALP SERPL-CCNC: 70 U/L
ALT SERPL W/O P-5'-P-CCNC: 29 U/L
ANION GAP SERPL CALC-SCNC: 8 MMOL/L
AST SERPL-CCNC: 36 U/L
BASOPHILS # BLD AUTO: 0.01 K/UL
BASOPHILS NFR BLD: 0.1 %
BILIRUB SERPL-MCNC: 0.8 MG/DL
BUN SERPL-MCNC: 13 MG/DL
CALCIUM SERPL-MCNC: 8.9 MG/DL
CHLORIDE SERPL-SCNC: 107 MMOL/L
CO2 SERPL-SCNC: 24 MMOL/L
CREAT SERPL-MCNC: 1.2 MG/DL
DIFFERENTIAL METHOD: ABNORMAL
EOSINOPHIL # BLD AUTO: 0.1 K/UL
EOSINOPHIL NFR BLD: 1.5 %
ERYTHROCYTE [DISTWIDTH] IN BLOOD BY AUTOMATED COUNT: 13.7 %
EST. GFR  (AFRICAN AMERICAN): >60 ML/MIN/1.73 M^2
EST. GFR  (NON AFRICAN AMERICAN): >60 ML/MIN/1.73 M^2
GLUCOSE SERPL-MCNC: 101 MG/DL
HCT VFR BLD AUTO: 35.7 %
HGB BLD-MCNC: 12.3 G/DL
LYMPHOCYTES # BLD AUTO: 1.1 K/UL
LYMPHOCYTES NFR BLD: 16.2 %
MAGNESIUM SERPL-MCNC: 1.9 MG/DL
MCH RBC QN AUTO: 32 PG
MCHC RBC AUTO-ENTMCNC: 34.5 %
MCV RBC AUTO: 93 FL
MONOCYTES # BLD AUTO: 0.8 K/UL
MONOCYTES NFR BLD: 12.2 %
NEUTROPHILS # BLD AUTO: 4.8 K/UL
NEUTROPHILS NFR BLD: 69.9 %
PLATELET # BLD AUTO: 191 K/UL
PMV BLD AUTO: 10.8 FL
POTASSIUM SERPL-SCNC: 4 MMOL/L
PROT SERPL-MCNC: 6.6 G/DL
RBC # BLD AUTO: 3.84 M/UL
SODIUM SERPL-SCNC: 139 MMOL/L
WBC # BLD AUTO: 6.8 K/UL

## 2017-06-08 PROCEDURE — 80053 COMPREHEN METABOLIC PANEL: CPT

## 2017-06-08 PROCEDURE — 85025 COMPLETE CBC W/AUTO DIFF WBC: CPT

## 2017-06-08 PROCEDURE — 36415 COLL VENOUS BLD VENIPUNCTURE: CPT

## 2017-06-08 PROCEDURE — 83735 ASSAY OF MAGNESIUM: CPT

## 2017-06-08 PROCEDURE — 25000003 PHARM REV CODE 250: Performed by: INTERNAL MEDICINE

## 2017-06-08 PROCEDURE — 99239 HOSP IP/OBS DSCHRG MGMT >30: CPT | Mod: ,,, | Performed by: NURSE PRACTITIONER

## 2017-06-08 PROCEDURE — 63600175 PHARM REV CODE 636 W HCPCS: Performed by: NURSE PRACTITIONER

## 2017-06-08 PROCEDURE — 25000003 PHARM REV CODE 250: Performed by: NURSE PRACTITIONER

## 2017-06-08 RX ORDER — METOPROLOL TARTRATE 25 MG/1
25 TABLET, FILM COATED ORAL 2 TIMES DAILY
Qty: 60 TABLET | Refills: 11 | Status: SHIPPED | OUTPATIENT
Start: 2017-06-08 | End: 2018-12-20 | Stop reason: SDUPTHER

## 2017-06-08 RX ORDER — CLOPIDOGREL BISULFATE 75 MG/1
75 TABLET ORAL DAILY
Qty: 30 TABLET | Refills: 11 | Status: SHIPPED | OUTPATIENT
Start: 2017-06-08 | End: 2018-09-18

## 2017-06-08 RX ORDER — ATORVASTATIN CALCIUM 80 MG/1
80 TABLET, FILM COATED ORAL DAILY
Qty: 30 TABLET | Refills: 11 | Status: SHIPPED | OUTPATIENT
Start: 2017-06-08 | End: 2018-11-05 | Stop reason: SDUPTHER

## 2017-06-08 RX ORDER — NITROGLYCERIN 0.4 MG/1
0.4 TABLET SUBLINGUAL EVERY 5 MIN PRN
Qty: 30 TABLET | Refills: 11 | Status: SHIPPED | OUTPATIENT
Start: 2017-06-08 | End: 2021-04-13

## 2017-06-08 RX ORDER — ASPIRIN 81 MG/1
81 TABLET ORAL DAILY
Qty: 30 TABLET | Refills: 11 | Status: SHIPPED | OUTPATIENT
Start: 2017-06-08 | End: 2021-04-13

## 2017-06-08 RX ADMIN — ASPIRIN 81 MG: 81 TABLET, COATED ORAL at 08:06

## 2017-06-08 RX ADMIN — MAGNESIUM SULFATE HEPTAHYDRATE 1 G: 500 INJECTION, SOLUTION INTRAMUSCULAR; INTRAVENOUS at 09:06

## 2017-06-08 RX ADMIN — CLOPIDOGREL 75 MG: 75 TABLET, FILM COATED ORAL at 08:06

## 2017-06-08 RX ADMIN — Medication 3 ML: at 06:06

## 2017-06-08 RX ADMIN — ATORVASTATIN CALCIUM 80 MG: 20 TABLET, FILM COATED ORAL at 08:06

## 2017-06-08 RX ADMIN — LOSARTAN POTASSIUM 100 MG: 50 TABLET, FILM COATED ORAL at 08:06

## 2017-06-08 RX ADMIN — METOPROLOL TARTRATE 25 MG: 25 TABLET ORAL at 08:06

## 2017-06-08 NOTE — PLAN OF CARE
Problem: Patient Care Overview  Goal: Plan of Care Review  Outcome: Ongoing (interventions implemented as appropriate)  Spoke with the patient and his wife regarding the plan of care. Noted that the patient recently had stents placed. He complains of no pain. Instructed on medication compliance. Patient states that he is ready for discharge. Will continue to monitor.

## 2017-06-08 NOTE — PROGRESS NOTES
"Post Cath Progress Note      Subjective:   Patient has no complaints. He denies chest pain, shortness of breath, back pain, or leg pain.  Ready to go home.      Objective:   Physical Exam   /86 (BP Method: cNIBP)   Pulse 74   Temp 98.1 °F (36.7 °C) (Oral)   Resp 14   Ht 5' 9" (1.753 m)   Wt 89 kg (196 lb 3.4 oz)   SpO2 95%   BMI 28.98 kg/m²    General: alert, awake and oriented, not in distress.  Neck: No JVD present.   Cardiovascular: Normal rate, regular rhythm, normal heart sounds and intact distal pulses.  Exam reveals no gallop and no friction rub. No murmur heard.  Pulmonary/Chest: Effort normal and breath sounds normal. No respiratory distress. He has no wheezes. He has no rales.   Abdominal: Soft. Bowel sounds are normal. He exhibits no distension. There is no tenderness. There is no rebound and no guarding.   Musculoskeletal: He no lower extremity edema. No Groin swelling or hematoma.  PULSES: The right radial pulse was 2+. The left radial pulse was 2+. The right femoral pulse was 2+. The left femoral pulse was 2+. The right post tibial pulse was 2+. The left post tibial pulse was 2+. The right dorsalis pedis pulse was 2+. The left dorsalis pedis pulse was 2+. No evidence of embolic phenomena or s/s of limb ischemia.         Assesment:  Mr. Vidal Haddad is a pleasant 61 y.o. gentleman who is s/p LAD PCI with JANET for NSTEMI.     Findings:   of Lcx  95% LAD  S/p Successful LAD PCI with JANET for ACS.      Plan/Recs:   - Post cath care per protocol.   - Aspirin 81 mg PO daily for life.   - Plavix 75 mg PO daily for a minimum of 1 year. ()   - CAD maximum medical management and risk factor modification.   - Please DC patient home this am.      Gretel Murray MD.  Cardiology Fellow.  06/08/2017 - 9:13 AM          "

## 2017-06-08 NOTE — PLAN OF CARE
06/08/17 1128   Final Note   Assessment Type Final Discharge Note   Discharge Disposition Home   Hospital Follow Up  Appt(s) scheduled? Yes

## 2017-06-08 NOTE — ASSESSMENT & PLAN NOTE
-chest pain free   -[facility] 6/6 with JANET to LAD, now chest pain free  -, will continue plavix  -continue statin, asa, HCTZ, metoprolol and losartan  -SL NTG PRN chest pain  -cardiac rehab arranged at [facility]  -outpt follow up arranged at [facility]

## 2017-06-08 NOTE — NURSING
PT D/C HOME PER MD ORDERS. TELEMETRY REMOVED, TELEMETRY ROOM NOTIFIED. IV ACCESS REMOVED AND INTACT X2. VITAL SIGNS STABLE, NO DISTRESS NOTED. NO COMPLAINTS NOTED AT THIS TIME. PT. GIVEN AND EXPLAINED MEDICATION LIST AND PRESCRIPTIONS. PT. VERBALIZES COMPLETE UNDERSTANDING OF ALL DISCHARGE INSTRUCTIONS AND FOLLOW UP CARE. PT. GIVEN PRINTED AFTER VISIT SUMMARY (AVS),  WIFE AT BEDSIDE. STATES NO NEED FOR ESCORT. WILL CONTINUE TO MONITOR.

## 2017-06-08 NOTE — PROGRESS NOTES
[facility]  Cardiology  Progress Note    Patient Name: Vidal Haddad  MRN: [id number]  Admission Date: 6/5/2017  Hospital Length of Stay: 3 days  Code Status: Full Code   Attending Physician: Francine Bob MD   Primary Care Physician: Jefe Gongora DO  Expected Discharge Date: 6/8/2017  Principal Problem:NSTEMI (non-ST elevated myocardial infarction)    Subjective:     Hospital Course:   61 year old male with hx of HTN presented to [facility] on 6/4/2017 with chest pain found to have NSTEMI. Had LHC on 6/5/2017 that showed LAD 95% with  of LCx and a trop of 24. Patient transferred to [facility] for PCI and CABG evaluation.      Interval History:   Patient without any further chest pain overnight. Feels much better this morning, ready to go home. Denies nausea, vomiting, SOB, cough, fevers, and chills.    ROS   Constitutional: no fever or chills  ENT: no nasal congestion or sore throat  Respiratory: no cough or shortness of breath  Cardiovascular: Negative for palpitations or chest pain  Gastrointestinal: no nausea or vomiting, no abdominal pain or abdominal distention   Genitourinary: no hematuria or dysuria  Integument/Breast: no rash or pruritis  Hematologic/Lymphatic: no easy bruising or lymphadenopathy  Musculoskeletal: no arthralgias or myalgias  Neurological: no seizures or tremors  Endocrine: no heat or cold intolerance    Objective:     Vital Signs (Most Recent):  Temp: 98.1 °F (36.7 °C) (06/08/17 0823)  Pulse: 74 (06/08/17 0630)  Resp: 14 (06/08/17 0630)  BP: 129/86 (06/08/17 0630)  SpO2: 95 % (06/08/17 0823) Vital Signs (24h Range):  Temp:  [98.1 °F (36.7 °C)-98.5 °F (36.9 °C)] 98.1 °F (36.7 °C)  Pulse:  [54-78] 74  Resp:  [14-19] 14  SpO2:  [80 %-95 %] 95 %  BP: (100-150)/() 129/86     Weight: 89 kg (196 lb 3.4 oz)  Body mass index is 28.98 kg/m².    SpO2: 95 %  O2 Device (Oxygen Therapy): room air      Intake/Output Summary (Last 24 hours) at 06/08/17 3561  Last data filed  at 06/08/17 0600   Gross per 24 hour   Intake              180 ml   Output              300 ml   Net             -120 ml       Lines/Drains/Airways     Peripheral Intravenous Line                 Peripheral IV - Single Lumen 06/04/17 1353 Left Hand 3 days         Peripheral IV - Single Lumen 06/04/17 1354 Right Hand 3 days                Physical Exam    General: well developed, well nourished, appears stated age, no distress, pale  Eyes: conjunctivae/corneas clear. PERRL..  Neck: supple, symmetrical, trachea midline, no JVD  Cardiovascular: Heart: regular rate and rhythm, S1, S2 normal, no murmur, click, rub or gallop.  Lungs: clear to auscultation bilaterally and normal respiratory effort  Chest Wall: no tenderness  Abdomen/Rectal: Abdomen: Non distended. + BS. No masses. No TTP. No rebound or guarding. Negative Dang's sign. Rectal: not examined  Extremities: no edema, redness or tenderness in the calves or thighs. Pulses: 2+ and symmetric  Skin: Skin color, texture, turgor normal. No rashes or lesions  Musculoskeletal: full range of motion of joints  Lymph Nodes: No cervical or supraclavicular adenopathy  Psych/Behavioral: Normal. Alert and oriented, appropriate affect.    Significant Labs:   Recent Results (from the past 24 hour(s))   CBC auto differential    Collection Time: 06/08/17  6:43 AM   Result Value Ref Range    WBC 6.80 3.90 - 12.70 K/uL    RBC 3.84 (L) 4.60 - 6.20 M/uL    Hemoglobin 12.3 (L) 14.0 - 18.0 g/dL    Hematocrit 35.7 (L) 40.0 - 54.0 %    MCV 93 82 - 98 fL    MCH 32.0 (H) 27.0 - 31.0 pg    MCHC 34.5 32.0 - 36.0 %    RDW 13.7 11.5 - 14.5 %    Platelets 191 150 - 350 K/uL    MPV 10.8 9.2 - 12.9 fL    Gran # 4.8 1.8 - 7.7 K/uL    Lymph # 1.1 1.0 - 4.8 K/uL    Mono # 0.8 0.3 - 1.0 K/uL    Eos # 0.1 0.0 - 0.5 K/uL    Baso # 0.01 0.00 - 0.20 K/uL    Gran% 69.9 38.0 - 73.0 %    Lymph% 16.2 (L) 18.0 - 48.0 %    Mono% 12.2 4.0 - 15.0 %    Eosinophil% 1.5 0.0 - 8.0 %    Basophil% 0.1 0.0 - 1.9 %     Differential Method Automated    Comprehensive metabolic panel    Collection Time: 06/08/17  6:43 AM   Result Value Ref Range    Sodium 139 136 - 145 mmol/L    Potassium 4.0 3.5 - 5.1 mmol/L    Chloride 107 95 - 110 mmol/L    CO2 24 23 - 29 mmol/L    Glucose 101 70 - 110 mg/dL    BUN, Bld 13 8 - 23 mg/dL    Creatinine 1.2 0.5 - 1.4 mg/dL    Calcium 8.9 8.7 - 10.5 mg/dL    Total Protein 6.6 6.0 - 8.4 g/dL    Albumin 3.4 (L) 3.5 - 5.2 g/dL    Total Bilirubin 0.8 0.1 - 1.0 mg/dL    Alkaline Phosphatase 70 55 - 135 U/L    AST 36 10 - 40 U/L    ALT 29 10 - 44 U/L    Anion Gap 8 8 - 16 mmol/L    eGFR if African American >60.0 >60 mL/min/1.73 m^2    eGFR if non African American >60.0 >60 mL/min/1.73 m^2   Magnesium    Collection Time: 06/08/17  6:43 AM   Result Value Ref Range    Magnesium 1.9 1.6 - 2.6 mg/dL       Assessment and Plan:       Active Diagnoses:    Diagnosis Date Noted POA    PRINCIPAL PROBLEM:  NSTEMI (non-ST elevated myocardial infarction) [I21.4] 06/05/2017 Yes    Essential hypertension [I10] 06/07/2017 Yes    Coronary artery disease involving native coronary artery of native heart [I25.10] 06/06/2017 Yes      Problems Resolved During this Admission:    Diagnosis Date Noted Date Resolved POA     NSTEMI  - LHC from OSH on 6/5/2017 revealing LAD 95% stenosis and  of LCx  - Continue medical management for NSTE-ACS, patient loaded and started on ASA, high-dose statin and lopressor at OSH   - loaded with Plavix 600 mg x currently on 75 mg po daily  - patient taken to cath lab acutely on 6/7/17 with ST elevations in anterior leads on EKG as well as new chest pain. Had JANET placed in LAD.  - Will need one year of DAPT, continue losartan and high dose statin. Continue beta blocker.  - Repeat ECHO without significant change in EF. No new wall motion abnormalities.   - Patient ok for discharge. To undergo cardiac rehab and follow up in cardiology clinic.     Diet: cardiac  Code: full    Case and plan to be  discussed with Dr. Antoine      VTE Risk Mitigation         Ordered     Medium Risk of VTE  Once      06/05/17 1007          González Mcallister MD  Cardiology  [facility]

## 2017-06-09 LAB
POC ACTIVATED CLOTTING TIME K: 213 SEC (ref 74–137)
SAMPLE: ABNORMAL

## 2018-02-14 ENCOUNTER — OFFICE VISIT (OUTPATIENT)
Dept: URGENT CARE | Facility: CLINIC | Age: 63
End: 2018-02-14
Payer: COMMERCIAL

## 2018-02-14 VITALS
OXYGEN SATURATION: 100 % | DIASTOLIC BLOOD PRESSURE: 99 MMHG | WEIGHT: 198 LBS | SYSTOLIC BLOOD PRESSURE: 193 MMHG | HEART RATE: 58 BPM | BODY MASS INDEX: 28.35 KG/M2 | HEIGHT: 70 IN | TEMPERATURE: 97 F

## 2018-02-14 DIAGNOSIS — M10.9 GOUTY ARTHRITIS OF LEFT GREAT TOE: Primary | ICD-10-CM

## 2018-02-14 PROCEDURE — 99203 OFFICE O/P NEW LOW 30 MIN: CPT | Mod: S$GLB,,, | Performed by: PHYSICIAN ASSISTANT

## 2018-02-14 PROCEDURE — 3008F BODY MASS INDEX DOCD: CPT | Mod: S$GLB,,, | Performed by: PHYSICIAN ASSISTANT

## 2018-02-14 RX ORDER — METHYLPREDNISOLONE 4 MG/1
TABLET ORAL
Qty: 1 PACKAGE | Refills: 0 | Status: SHIPPED | OUTPATIENT
Start: 2018-02-14 | End: 2018-09-18

## 2018-02-14 RX ORDER — HYDROCODONE BITARTRATE AND ACETAMINOPHEN 5; 325 MG/1; MG/1
1 TABLET ORAL EVERY 6 HOURS PRN
Qty: 12 TABLET | Refills: 0 | Status: SHIPPED | OUTPATIENT
Start: 2018-02-14 | End: 2018-02-17

## 2018-02-14 NOTE — PATIENT INSTRUCTIONS
- Please return here or go to the Emergency Department for any concerns or worsening of condition.   - Apply ice packs to the affected area(s) for 20-30 minutes several times daily for swelling and pain in addition to rest, elevation, and compression (ex. Extremities). Allow 1 hour between icing sessions to avoid damage to skin.   * Large, cheap, and reusable ice pack(s) can be easily made as follows. INSTRUCTIONS: Mix 1 cup of rubbing (isopropyl) alcohol to 3 cups water into a 1 gallon zip lock bag. Squeeze remaining air out of the bag and seal. Bag(s) to be kept flat in a freezer until cold and after each use.   - Please follow up with your primary care provider (PCP) or discussed specialist(s) as needed.           Gout    Gout is an inflammation of a joint due to a build-up of gout crystals in the joint fluid. This occurs when there is an excess of uric acid (a normal waste product) in the body. Uric acid builds up in the body when the kidneys are unable to filter enough of it from the blood. This may occur with age. It is also associated with kidney disease. Gout occurs more often in people with obesity, diabetes, high blood pressure, or high levels of fats in the blood. It may run in families. Gout tends to come and go. A flare up of gout is called an attack. Drinking alcohol or eating certain foods (such as shellfish or foods with additives such as high-fructose corn syrup) may increase uric acid levels in the blood and cause a gout attack.  During a gout attack, the affected joint may become a hot, red, swollen and painful. If you have had one attack of gout, you are likely to have another. An attack of gout can be treated with medicine. If these attacks become frequent, a daily medicine may be prescribed to help the kidneys remove uric acid from the body.  Home care  During a gout attack:  · Rest painful joints. If gout affects the joints of your foot or leg, you may want to use crutches for the first few  days to keep from bearing weight on the affected joint.  · When sitting or lying down, raise the painful joint to a level higher than your heart.  · Apply an ice pack (ice cubes in a plastic bag wrapped in a thin towel) over the injured area for 20 minutes every 1 to 2 hours the first day for pain relief. Continue this 3 to 4 times a day for swelling and pain.  · Avoid alcohol and foods listed below (see Preventing attacks) during a gout attack. Drink extra fluid to help flush the uric acid through your kidneys.  · If you were prescribed a medicine to treat gout, take it as your healthcare provider has instructed. Don't skip doses.  · Take anti-inflammatory medicine as directed.   · If pain medicines have been prescribed, take them exactly as directed.    Preventing attacks  · Minimize or avoid alcohol use. Excess alcohol intake can cause a gout attack.  · Limit these foods and beverages:  ¨ Organ meats, such as kidneys and liver  ¨ Certain seafoods (anchovies, sardines, shrimp, scallops, herring, mackerel)  ¨ Wild game, meat extracts and meat gravies  ¨ Foods and beverages sweetened with high-fructose corn syrup, such as sodas  · Eat a healthy diet including low-fat and nonfat dairy, whole grains, and vegetables.  · If you are overweight, talk to your healthcare provider about a weight reduction plan. Avoid fasting or extreme low calorie diets (less than 900 calories per day). This will increase uric acid levels in the body.  · If you have diabetes or high blood pressure, work with your doctor to manage these conditions.  · Protect the joint from injury. Trauma can trigger a gout attack.  Follow-up care  Follow up with your healthcare provider, or as advised.   When to seek medical advice  Call your healthcare provider if you have any of the following:  · Fever over 100.4°F (38.ºC) with worsening joint pain  · Increasing redness around the joint  · Pain developing in another joint  · Repeated vomiting, abdominal  pain, or blood in the vomit or stool (black or red color)  Date Last Reviewed: 3/1/2017  © 0377-0393 [employer]. [address] [place] [address], [place] [address]. All rights reserved. This information is not intended as a substitute for professional medical care. Always follow your healthcare professional's instructions.        Gout Diet  Gout is a painful condition caused by an excess of uric acid, a waste product made by the body. Uric acid forms crystals that collect in the joints. The immune response to these crystals brings on symptoms of joint pain and swelling. This is called a gout attack. Often, medications and diet changes are combined to manage gout. Below are some guidelines for changing your diet to help you manage gout and prevent attacks. Your health care provider will help you determine the best eating plan for you.     Eating to manage gout  Weight loss for those who are overweight may help reduce gout attacks.  Eat less of these foods  Eating too many foods containing purines may raise the levels of uric acid in your body. This raises your risk for a gout attack. Try to limit these foods and drinks:  · Alcohol, such as beer and red wine. You may be told to avoid alcohol completely.  · Soft drinks that contain sugar or high fructose corn syrup  · Certain fish, including anchovies, sardines, fish eggs, and herring  · Shellfish  · Certain meats, such as red meat, hot dogs, luncheon meats, and turkey  · Organ meats, such as liver, kidneys, and sweetbreads  · Legumes, such as dried beans and peas  · Other high fat foods such as gravy, whole milk, and high fat cheeses  · Vegetables such as asparagus, cauliflower, spinach, and mushrooms used to be thought to contribute to an increased risk for a gout attack, but recent studies show that high purine vegetables don't increase the risk for a gout attack.  Eat more of these foods  Other foods may be helpful for people with gout. Add some of these  foods to your diet:  · Cherries contain chemicals that may lower uric acid.  · Omega fatty acids. These are found in some fatty fish such as salmon, certain oils (flax, olive, or nut), and nuts themselves. Omega fatty acids may help prevent inflammation due to gout.  · Dairy products that are low-fat or fat-free, such as cheese and yogurt  · Complex carbohydrate foods, including whole grains, brown rice, oats, and beans  · Coffee, in moderation  · Water, approximately 64 ounces per day  Follow-up care  Follow up with your healthcare provider as advised.  When to seek medical advice  Call your healthcare provider right away if any of these occur:  · Return of gout symptoms, usually at night:  · Severe pain, swelling, and heat in a joint, especially the base of the big toe  · Affected joint is hard to move  · Skin of the affected joint is purple or red  · Fever of 100.4°F (38°C) or higher  · Pain that doesn't get better even with prescribed medicine   Date Last Reviewed: 1/12/2016  © 6916-9951 [employer] [address] [place] [address], [place] [address]. All rights reserved. This information is not intended as a substitute for professional medical care. Always follow your healthcare professional's instructions.

## 2018-02-14 NOTE — PROGRESS NOTES
"Subjective:       Patient ID: Vidal Haddad is a 62 y.o. male.    Vitals:  height is 5' 10" (1.778 m) and weight is 89.8 kg (198 lb). His oral temperature is 97.1 °F (36.2 °C). His blood pressure is 193/99 (abnormal) and his pulse is 58 (abnormal). His oxygen saturation is 100%.     Chief Complaint: Gout (LT. foot)    Foot Injury    The incident occurred 3 to 5 days ago. There was no injury mechanism. The pain is present in the left toes (MTP of left great toe). The pain has been constant since onset. Associated symptoms include an inability to bear weight and a loss of motion (due to pain in left great toe). Pertinent negatives include no loss of sensation, muscle weakness, numbness or tingling. The symptoms are aggravated by movement, weight bearing and palpation. He has tried ice and NSAIDs for the symptoms. The treatment provided no relief.     Active Ambulatory Problems     Diagnosis Date Noted    Gout 08/25/2014    NSTEMI (non-ST elevated myocardial infarction) 06/05/2017    Coronary artery disease involving native coronary artery of native heart 06/06/2017    Essential hypertension 06/07/2017     Resolved Ambulatory Problems     Diagnosis Date Noted    No Resolved Ambulatory Problems     Past Medical History:   Diagnosis Date    Gout     Hypertension     Myocardial infarction       Review of Systems   Constitution: Negative for chills and fever.   HENT: Negative for sore throat.    Eyes: Negative for blurred vision.   Cardiovascular: Negative for chest pain.   Respiratory: Negative for shortness of breath.    Skin: Negative for itching and rash.   Musculoskeletal: Positive for joint pain (Lt foot) and joint swelling. Negative for back pain.   Gastrointestinal: Negative for abdominal pain, diarrhea, nausea and vomiting.   Neurological: Negative for headaches, numbness and tingling.   Psychiatric/Behavioral: The patient is not nervous/anxious.        Objective:      Physical Exam   Constitutional: He " is oriented to person, place, and time. He appears well-developed and well-nourished.  Non-toxic appearance. He does not have a sickly appearance. He does not appear ill. He appears distressed.   HENT:   Head: Normocephalic and atraumatic. Head is without abrasion, without contusion and without laceration.   Right Ear: External ear normal.   Left Ear: External ear normal.   Nose: Nose normal.   Mouth/Throat: Oropharynx is clear and moist.   Eyes: Conjunctivae, EOM and lids are normal. Pupils are equal, round, and reactive to light.   Neck: Trachea normal, full passive range of motion without pain and phonation normal. Neck supple.   Cardiovascular: Normal rate, regular rhythm and normal heart sounds.    Pulmonary/Chest: Effort normal and breath sounds normal. No stridor. No respiratory distress.   Musculoskeletal:        Left ankle: Normal. He exhibits normal range of motion, no swelling, no ecchymosis, no deformity and normal pulse. No tenderness. No lateral malleolus, no medial malleolus, no AITFL, no CF ligament, no posterior TFL, no head of 5th metatarsal and no proximal fibula tenderness found. Achilles tendon normal. Achilles tendon exhibits no pain, no defect and normal Keith's test results.        Left lower leg: Normal. He exhibits no tenderness, no bony tenderness, no swelling, no edema, no deformity and no laceration.        Left foot: There is decreased range of motion (at MTP of left great toe), tenderness (MTP left great toe), bony tenderness and swelling (MTP of left great toe). There is normal capillary refill, no crepitus, no deformity and no laceration.        Feet:    Feet:   Left Foot:   Skin Integrity: Positive for erythema (left great toe MTP) and warmth (left great toe MTP). Negative for ulcer, blister, skin breakdown or callus.   Neurological: He is alert and oriented to person, place, and time.   Skin: Skin is warm, dry and intact. Capillary refill takes less than 2 seconds. No abrasion,  no bruising, no burn, no ecchymosis, no laceration, no lesion and no rash noted. No erythema.   Psychiatric: He has a normal mood and affect. His speech is normal and behavior is normal. Judgment and thought content normal. Cognition and memory are normal.   Nursing note and vitals reviewed.      Assessment:       1. Gouty arthritis of left great toe        Plan:         Gouty arthritis of left great toe  -     methylPREDNISolone (MEDROL DOSEPACK) 4 mg tablet; use as directed  Dispense: 1 Package; Refill: 0  -     hydrocodone-acetaminophen 5-325mg (NORCO) 5-325 mg per tablet; Take 1 tablet by mouth every 6 (six) hours as needed for Pain.  Dispense: 12 tablet; Refill: 0    - Please return here or go to the Emergency Department for any concerns or worsening of condition.   - Apply ice packs to the affected area(s) for 20-30 minutes several times daily for swelling and pain in addition to rest, elevation, and compression (ex. Extremities). Allow 1 hour between icing sessions to avoid damage to skin.   * Large, cheap, and reusable ice pack(s) can be easily made as follows. INSTRUCTIONS: Mix 1 cup of rubbing (isopropyl) alcohol to 3 cups water into a 1 gallon zip lock bag. Squeeze remaining air out of the bag and seal. Bag(s) to be kept flat in a freezer until cold and after each use.   - Please follow up with your primary care provider (PCP) or discussed specialist(s) as needed.

## 2018-07-23 ENCOUNTER — OFFICE VISIT (OUTPATIENT)
Dept: URGENT CARE | Facility: CLINIC | Age: 63
End: 2018-07-23
Payer: COMMERCIAL

## 2018-07-23 VITALS
HEIGHT: 70 IN | DIASTOLIC BLOOD PRESSURE: 107 MMHG | HEART RATE: 62 BPM | OXYGEN SATURATION: 97 % | BODY MASS INDEX: 28.35 KG/M2 | TEMPERATURE: 98 F | SYSTOLIC BLOOD PRESSURE: 188 MMHG | WEIGHT: 198 LBS

## 2018-07-23 DIAGNOSIS — L03.312 CELLULITIS OF BACK: ICD-10-CM

## 2018-07-23 DIAGNOSIS — L72.0 EPIDERMOID CYST OF SKIN: Primary | ICD-10-CM

## 2018-07-23 PROCEDURE — 99203 OFFICE O/P NEW LOW 30 MIN: CPT | Mod: S$GLB,,, | Performed by: FAMILY MEDICINE

## 2018-07-23 PROCEDURE — 3080F DIAST BP >= 90 MM HG: CPT | Mod: CPTII,S$GLB,, | Performed by: FAMILY MEDICINE

## 2018-07-23 PROCEDURE — 3077F SYST BP >= 140 MM HG: CPT | Mod: CPTII,S$GLB,, | Performed by: FAMILY MEDICINE

## 2018-07-23 PROCEDURE — 3008F BODY MASS INDEX DOCD: CPT | Mod: CPTII,S$GLB,, | Performed by: FAMILY MEDICINE

## 2018-07-23 RX ORDER — CLINDAMYCIN HYDROCHLORIDE 150 MG/1
300 CAPSULE ORAL EVERY 8 HOURS
Qty: 42 CAPSULE | Refills: 0 | Status: SHIPPED | OUTPATIENT
Start: 2018-07-23 | End: 2018-07-30

## 2018-07-23 NOTE — PATIENT INSTRUCTIONS
Epidermoid Cyst (Sebaceous Cyst), Infected (Antibiotic Treatment)  You have an epidermoid cyst. This is a small, painless lump under your skin. An epidermoid cyst (often called a sebaceous cyst, epidermal cyst, or epidermal inclusion cyst) is a term most often used for 2 similar types of cysts:  · Epidermoid cysts. These cysts form slowly under the skin. They can be found on most parts of the body. But they are most often found on areas with more hair such as the scalp, face, upper back, and genitals.  · Pilar cysts. These are similar to epidermoid cysts. But they start from a different part of the hair follicle. They are more likely to be on the scalp.  Here are some general facts about these cysts:  · A cyst is a sac filled with material that is often cheesy, fatty, oily, or stringy. The material inside can be thick. Or it can be a liquid.  · You can usually move the cyst slightly if you try.  · The cysts can be smaller than a pea or as large as a few inches.  · The cysts are usually not painful, unless they become inflamed or infected.  · The area around the cyst may smell bad. If the cyst breaks open, the material inside it often smells bad as well.  Your cyst became infected and your healthcare provider wanted to treat it with antibiotics. If the antibiotics dont clear up the infection, the cyst will need to be drained by making a small cut (incision). Local anesthesia will be used to numb the area.  Home care  · Resist the temptation to squeeze or pop the cyst, stick a needle in it, or cut it open. This often leads to a worsening infection and scarring.  · Take the antibiotic as directed until it is all used up.  · Soak the affected area in hot water or apply a hot pack (a thin, clean towel soaked in hot water) for 20 minutes at a time. Do this 3 to 4 times a day.  · Apply antibiotic cream or ointment 3 times a day.  · You may use over-the-counter pain medicine to control pain, unless another medicine was  given. If you have chronic liver or kidney disease or ever had a stomach ulcer or GI bleeding, talk with your healthcare provider before using these medicines.  Prevention  Once this infection has healed, reduce the risk of future infections by:  · Keeping the cyst area clean by bathing or showering daily  · Avoiding tight-fitting clothing in the cyst area  Follow-up care  Follow up with your healthcare provider, or as advised. If a gauze packing was put in your wound, it should be removed in a few days as advised by your healthcare provider. Check your wound every day for the signs listed below.  When to seek medical advice  Call your healthcare provider right away if any of these occur:  · Pus coming from the cyst  · Increasing redness around the wound  · Increasing local pain or swelling  · Fever of 100.4°F (38ºC) or higher, or as directed by your provider  Date Last Reviewed: 10/5/2016  © 7561-4821 [employer]. [address] [place] [address], [place] [address]. All rights reserved. This information is not intended as a substitute for professional medical care. Always follow your healthcare professional's instructions.

## 2018-07-23 NOTE — PROGRESS NOTES
"Subjective:       Patient ID: Vidal Haddad is a 63 y.o. male.    Vitals:  height is 5' 10" (1.778 m) and weight is 89.8 kg (198 lb). His oral temperature is 97.5 °F (36.4 °C). His blood pressure is 188/107 (abnormal) and his pulse is 62. His oxygen saturation is 97%.     Chief Complaint: Abscess (back)    Abscess   Chronicity:  New  Onset:  7 days or greater  Progression Since Onset: gradually worsening  Associated Symptoms: no fever, no chills  Characteristics: painful (tender to the touch) and redness    Treatments Tried:  Nothing  Relieved by:  Nothing  Pt has had an epidermoid mass on his back for years.  A few days ago it became mildly uncomfortable.  He states he had one drained from another part of his back years ago and was told it was a 'staph infection'.    Denies fever, chills, or other systemic symptoms. .    Review of Systems   Constitution: Negative for chills and fever.   HENT: Negative for sore throat.    Respiratory: Negative for shortness of breath.    Skin: Negative for itching and rash (abcsess).   Musculoskeletal: Negative for joint pain.       Objective:      Physical Exam   Constitutional: He appears well-nourished.   HENT:   Head: Normocephalic.   Cardiovascular: Normal rate and regular rhythm.    Pulmonary/Chest: Effort normal and breath sounds normal.   Skin: Skin is warm and dry.   1 cm epidermoid cyst of left mid-back.  Mildly tender.  Minimal induration.  No fluctuance.  No pointing.         Assessment:       No diagnosis found.    Plan:     Discussed options.  I&D w/ attempted excision of cyst vs. Antibiotic tx and later excision when not infected.  Pt would prefer to avoid I&D at this time.   Will tx w/ Clindamycin.  Rec f/u w/ PCP or Derm for excision once acute inflammation subsides.      There are no diagnoses linked to this encounter.     "

## 2018-07-26 ENCOUNTER — TELEPHONE (OUTPATIENT)
Dept: URGENT CARE | Facility: CLINIC | Age: 63
End: 2018-07-26

## 2018-07-31 ENCOUNTER — OFFICE VISIT (OUTPATIENT)
Dept: URGENT CARE | Facility: CLINIC | Age: 63
End: 2018-07-31
Payer: COMMERCIAL

## 2018-07-31 VITALS
HEART RATE: 69 BPM | DIASTOLIC BLOOD PRESSURE: 98 MMHG | OXYGEN SATURATION: 96 % | SYSTOLIC BLOOD PRESSURE: 158 MMHG | RESPIRATION RATE: 16 BRPM | TEMPERATURE: 98 F

## 2018-07-31 DIAGNOSIS — L02.212 CUTANEOUS ABSCESS OF BACK (ANY PART, EXCEPT BUTTOCK): Primary | ICD-10-CM

## 2018-07-31 PROCEDURE — 99214 OFFICE O/P EST MOD 30 MIN: CPT | Mod: 25,S$GLB,, | Performed by: FAMILY MEDICINE

## 2018-07-31 PROCEDURE — 3080F DIAST BP >= 90 MM HG: CPT | Mod: CPTII,S$GLB,, | Performed by: FAMILY MEDICINE

## 2018-07-31 PROCEDURE — 10060 I&D ABSCESS SIMPLE/SINGLE: CPT | Mod: S$GLB,,, | Performed by: FAMILY MEDICINE

## 2018-07-31 PROCEDURE — 3077F SYST BP >= 140 MM HG: CPT | Mod: CPTII,S$GLB,, | Performed by: FAMILY MEDICINE

## 2018-07-31 RX ORDER — CLINDAMYCIN HYDROCHLORIDE 300 MG/1
300 CAPSULE ORAL EVERY 8 HOURS
Qty: 21 CAPSULE | Refills: 0 | Status: SHIPPED | OUTPATIENT
Start: 2018-07-31 | End: 2018-08-07

## 2018-07-31 RX ORDER — HYDROCODONE BITARTRATE AND ACETAMINOPHEN 7.5; 325 MG/1; MG/1
1-2 TABLET ORAL EVERY 6 HOURS PRN
Qty: 12 TABLET | Refills: 0 | Status: SHIPPED | OUTPATIENT
Start: 2018-07-31 | End: 2021-04-13

## 2018-07-31 NOTE — PATIENT INSTRUCTIONS
Abscess (Incision & Drainage)  An abscess is sometimes called a boil. It happens when bacteria get trapped under the skin and start to grow. Pus forms inside the abscess as the body responds to the bacteria. An abscess can happen with an insect bite, ingrown hair, blocked oil gland, pimple, cyst, or puncture wound.  Your healthcare provider has drained the pus from your abscess. If the abscess pocket was large, your healthcare provider may have put in gauze packing. Your provider will need to remove it on your next visit. He or she may also replace it at that time. You may not need antibiotics to treat a simple abscess, unless the infection is spreading into the skin around the wound (cellulitis).  The wound will take about 1 to 2 weeks to heal, depending on the size of the abscess. Healthy tissue will grow from the bottom and sides of the opening until it seals over.  Home care  These tips can help your wound heal:  · The wound may drain for the first 2 days. Cover the wound with a clean dry dressing. Change the dressing if it becomes soaked with blood or pus.  · A gauze packing was placed inside the abscess pocket, you may remove it yourself. You may do this in the shower in 2 days. Once the packing is removed, you should wash the area in the shower, or clean the area as directed by your provider. Continue to do this until the skin opening has closed. Make sure you wash your hands after changing the packing or cleaning the wound.  · If you were prescribed antibiotics, take them as directed until they are all gone.  · You may use acetaminophen or ibuprofen to control pain, unless another pain medicine was prescribed. If you have liver disease or ever had a stomach ulcer, talk with your doctor before using these medicines.  Follow-up care  Follow up with your healthcare provider, or as advised. If a gauze packing was put in your wound, it should be removed in 1 to 2 days. Check your wound every day for any signs  that the infection is getting worse. The signs are listed below.  When to seek medical advice  Call your healthcare provider right away if any of these occur:  · Increasing redness or swelling  · Red streaks in the skin leading away from the wound  · Increasing local pain or swelling  · Continued pus draining from the wound 2 days after treatment  · Fever of 100.4ºF (38ºC) or higher, or as directed by your healthcare provider  · Boil returns when you are at home  Date Last Reviewed: 9/1/2016  © 4363-8788 [employer]. [address] [place] [address]. All rights reserved. This information is not intended as a substitute for professional medical care. Always follow your healthcare professional's instructions.

## 2018-07-31 NOTE — PROGRESS NOTES
Subjective:       Patient ID: Vidal Haddad is a 63 y.o. male.    Vitals:  oral temperature is 98 °F (36.7 °C). His blood pressure is 158/98 (abnormal) and his pulse is 69. His respiration is 16 and oxygen saturation is 96%.     Chief Complaint: Abscess    Pt c/o abscess on back, started 2 weeks, worsening, redness,  tender to touch, was prescribed clindamycin, took full dose with no relief,         Abscess   Chronicity:  NewProgression Since Onset: worsening  Location:  Torso  Associated Symptoms: no fever, no chills, no sweats  Characteristics: redness    Characteristics: not draining    Treatments Tried:  Oral antibiotics  Relieved by:  Nothing    Review of Systems   Constitution: Negative for chills and fever.   HENT: Negative for sore throat.    Eyes: Negative for blurred vision.   Cardiovascular: Negative for chest pain.   Respiratory: Negative for shortness of breath.    Skin: Negative for rash.        abscess   Musculoskeletal: Negative for back pain and joint pain.   Gastrointestinal: Negative for abdominal pain, diarrhea, nausea and vomiting.   Neurological: Negative for headaches.   Psychiatric/Behavioral: The patient is not nervous/anxious.        Objective:      Physical Exam   Constitutional: He appears well-developed and well-nourished.   Skin: Skin is warm.   2 cm abscess w/ additional 2 cm of surrounding erythema.  Pustular head, but no drainage.     PT also has a dormant epidermoid cyst overlying his right scapula.        Assessment:       1. Cutaneous abscess of back (any part, except buttock)        Plan:         Cutaneous abscess of back (any part, except buttock)    Other orders  -     clindamycin (CLEOCIN) 300 MG capsule; Take 1 capsule (300 mg total) by mouth every 8 (eight) hours. for 7 days  Dispense: 21 capsule; Refill: 0  -     HYDROcodone-acetaminophen (NORCO) 7.5-325 mg per tablet; Take 1-2 tablets by mouth every 6 (six) hours as needed for Pain.  Dispense: 12 tablet; Refill:  0      Abscess I&Ded and packed.    Instructions for post I&D care provided.

## 2018-07-31 NOTE — PROCEDURES
"Incision & Drainage  Date/Time: 7/31/2018 5:43 PM  Performed by: YUKO SALGUERO  Authorized by: YUKO SALGUERO     Time out: Immediately prior to procedure a "time out" was called to verify the correct patient, procedure, equipment, support staff and site/side marked as required.    Consent Done?:  Yes (Verbal)    Type:  Abscess  Body area:  Trunk  Location details:  Back  Anesthesia:  Local infiltration  Local anesthetic:  Xylocaine 1% with epinephrinelidocaine 1% with epinephrine  Anesthetic total (ml):  8  Scalpel size:  15  Incision type:  Single straight  Complexity:  Simple  Drainage:  Pus and bloody  Drainage amount:  Moderate  Wound treatment:  Wound left open  Packing material:  1/4 in iodoform gauze  Patient tolerance:  Patient tolerated the procedure well with no immediate complications      "

## 2018-08-03 ENCOUNTER — TELEPHONE (OUTPATIENT)
Dept: URGENT CARE | Facility: CLINIC | Age: 63
End: 2018-08-03

## 2020-05-08 ENCOUNTER — OFFICE VISIT (OUTPATIENT)
Dept: URGENT CARE | Facility: CLINIC | Age: 65
End: 2020-05-08
Payer: COMMERCIAL

## 2020-05-08 VITALS
DIASTOLIC BLOOD PRESSURE: 78 MMHG | HEIGHT: 70 IN | OXYGEN SATURATION: 99 % | HEART RATE: 66 BPM | RESPIRATION RATE: 16 BRPM | TEMPERATURE: 98 F | WEIGHT: 203 LBS | BODY MASS INDEX: 29.06 KG/M2 | SYSTOLIC BLOOD PRESSURE: 141 MMHG

## 2020-05-08 DIAGNOSIS — M10.9 ACUTE GOUT, UNSPECIFIED CAUSE, UNSPECIFIED SITE: Primary | ICD-10-CM

## 2020-05-08 PROCEDURE — 99214 OFFICE O/P EST MOD 30 MIN: CPT | Mod: S$GLB,,, | Performed by: FAMILY MEDICINE

## 2020-05-08 PROCEDURE — 99214 PR OFFICE/OUTPT VISIT, EST, LEVL IV, 30-39 MIN: ICD-10-PCS | Mod: S$GLB,,, | Performed by: FAMILY MEDICINE

## 2020-05-08 RX ORDER — PREDNISONE 20 MG/1
TABLET ORAL
Qty: 10 TABLET | Refills: 1 | Status: SHIPPED | OUTPATIENT
Start: 2020-05-08 | End: 2021-04-13

## 2020-05-08 NOTE — PROGRESS NOTES
"Subjective:       Patient ID: Vidal Haddad is a 64 y.o. male.    Vitals:  height is 5' 10" (1.778 m) and weight is 92.1 kg (203 lb). His oral temperature is 97.5 °F (36.4 °C). His blood pressure is 141/78 (abnormal) and his pulse is 66. His respiration is 16 and oxygen saturation is 99%.     Chief Complaint: Gout    Pt presents today with right foot pain x 1 week. Pt has hx of gout. Pts right foot has some swelling. Pt says the swelling goes down at night. Pt denies numbness and tingling. Taking Aleve & Ibuprofen - last taken aleve at 5:30AM this morning - and experiences mild relief.     Pain   This is a new problem. The current episode started 1 to 4 weeks ago. The problem has been gradually worsening. Associated symptoms include joint swelling. Pertinent negatives include no arthralgias, chest pain, chills, congestion, coughing, fatigue, fever, headaches, myalgias, nausea, numbness, rash, sore throat, vertigo or vomiting. Treatments tried: aleve, ibuprofen.       Constitution: Negative for chills, fatigue and fever.   HENT: Negative for congestion and sore throat.    Neck: Negative for painful lymph nodes.   Cardiovascular: Negative for chest pain and leg swelling.   Eyes: Negative for double vision and blurred vision.   Respiratory: Negative for cough and shortness of breath.    Gastrointestinal: Negative for nausea, vomiting and diarrhea.   Genitourinary: Negative for dysuria, frequency and urgency.   Musculoskeletal: Positive for pain and joint swelling. Negative for joint pain, muscle cramps and muscle ache.   Skin: Negative for color change, pale and rash.   Allergic/Immunologic: Negative for seasonal allergies.   Neurological: Negative for dizziness, history of vertigo, light-headedness, passing out, headaches and numbness.   Hematologic/Lymphatic: Negative for swollen lymph nodes, easy bruising/bleeding and history of blood clots. Does not bruise/bleed easily.   Psychiatric/Behavioral: Negative for " nervous/anxious, sleep disturbance and depression. The patient is not nervous/anxious.        Objective:      Physical Exam   Musculoskeletal: He exhibits tenderness (right medial great toe).       Physical Exam   Constitutional: She is oriented to person, place, and time. She appears well-developed and well-nourished.   HENT:   Head: Normocephalic and atraumatic.   Nose: Nose normal.   Eyes: Conjunctivae, EOM and lids are normal.   Neck: Trachea normal, full passive range of motion without pain and phonation normal. Neck supple.   Cardiovascular: Normal rate.   Pulmonary/Chest: Effort normal and breath sounds normal.   Musculoskeletal: Normal range of motion.   Neurological: She is alert and oriented to person, place, and time.   Skin: Skin is intact and no rash.   Psychiatric: She has a normal mood and affect. Her speech is normal and behavior is normal. Judgment and thought content normal. Cognition and memory are normal.   Nursing note and vitals reviewed.    Assessment:       1. Acute gout, unspecified cause, unspecified site        Plan:         Acute gout, unspecified cause, unspecified site    Other orders  -     predniSONE (DELTASONE) 20 MG tablet; Take 40mg x2 days, 30 mg x2 days, 20mg x2 days, 10mg x2 days  Dispense: 10 tablet; Refill: 1

## 2020-05-08 NOTE — PATIENT INSTRUCTIONS
"GOUT   "gouty arthritis" is an inflammation of a joint due to a build-up of gout crystals in the joint fluid. This occurs when there is an excess uric acid (a normal waste product) in the body. Uric acid builds up in the body when the kidneys are unable to filter enough of it from the blood. This may occur with aging or kidney disease. Gout occurs more often in persons with obesity, diabetes, hypertension, high fats in the blood. It may be present in other family members. Alcohol and certain foods (such as shellfish and alcohol) may increase uric acid levels in the blood and cause a gout attack.    Gout causes a hot, red, swollen and painful joint. If you have had one episode of gout, you are likely to have another. An acute attack of gout can be treated with anti-inflammatory and other medicine. If these attacks become frequent it may be necessary to take a daily medicine to help the kidney remove uric acid from the body.    HOME CARE:  1. Apply an ice pack (ice cubes in a plastic bag, wrapped in a towel) over the injured area for 20 minutes every 1-2 hours the first day for pain relief. Continue this 3-4 times a day until the pain and swelling goes away.  2. Avoid alcohol and foods listed below (see Prevention) during a gout attack. Drink extra fluid to help flush the uric acid through your kidneys.  3. Rest painful joints. If gout affects the joints of your foot or leg, you may want to use crutches for the first few days to keep from bearing weight on the foot or leg.  4. Take anti-inflammatory medicine as directed. You may be prescribed Indocin (indomethacin), or over-the-counter drugs such as ibuprofen (Motrin, Advil) or naproxen (Naprosyn or Aleve). Tylenol will not be as effective since it is not an anti-inflammatory drug.  5. If narcotic pain medicines have been prescribed, they should be used in addition to the anti-inflammatory drugs and only for severe pain. Avoid aspirin since this may slow down the " flushing of the uric acid through your kidneys.    PREVENTING FUTURE ATTACKS:  Minimize or avoid alcohol use. Excess alcohol intake can cause a gout attack.  Foods high in purine form uric acid in the body and increase your risk for a gout attack. Therefore, avoid the following foods: certain seafoods (anchovies, sardines, shrimp, scallops, herring, mackerel); wild game, meat extracts and meat gravies; organ foods (kidney, liver, calf brain, sweetbreads). Avoid drinks with fructose (a type of sugar).  Limit the following foods to one serving a day: red meat and pork, fish, poultry, dried beans and peas, asparagus, mushrooms, cauliflower and spinach.  If you are overweight, this is a risk factor and you should talk to your doctor about a weight reduction plan. However, avoid fasting or extreme low calorie diets (less than 900 goyo/day) which will increase uric acid levels in the body.  If you are diabetic or have high blood pressure, work with your doctor to achieve control of these conditions.  Avoid injury to the involved joint since this can lead to a gout attack.  Colchicine can be effective in stopping a gout attack. If you were given a prescription of this medicine for future use, begin it at the first sign of an attack. Colchicine may cause nausea, vomiting, diarrhea and other side effects.    FOLLOW UP with your doctor as advised or if you are not improving after three days of treatment.    GET PROMPT MEDICAL ATTENTION if any of the following occur:  Fever over 100.4ºF (38.0ºC) with worsening joint pain  Increasing redness around the joint  Pain developing in another joint  Repeated vomiting, abdominal pain, or blood in the vomit or stool (black or red color)

## 2021-05-10 ENCOUNTER — PATIENT MESSAGE (OUTPATIENT)
Dept: RESEARCH | Facility: HOSPITAL | Age: 66
End: 2021-05-10

## 2021-07-06 ENCOUNTER — CLINICAL SUPPORT (OUTPATIENT)
Dept: URGENT CARE | Facility: CLINIC | Age: 66
End: 2021-07-06
Payer: MEDICARE

## 2021-07-06 VITALS — TEMPERATURE: 98 F

## 2021-07-06 DIAGNOSIS — Z23 NEED FOR VACCINATION: Primary | ICD-10-CM

## 2021-07-06 PROCEDURE — 0031A COVID-19,VECTOR-NR,RS-AD26,PF,0.5 ML DOSE VACCINE (JANSSEN): CPT | Mod: CV19,S$GLB,, | Performed by: EMERGENCY MEDICINE

## 2021-07-06 PROCEDURE — 91303 COVID-19,VECTOR-NR,RS-AD26,PF,0.5 ML DOSE VACCINE (JANSSEN): ICD-10-PCS | Mod: S$GLB,,, | Performed by: EMERGENCY MEDICINE

## 2021-07-06 PROCEDURE — 0031A COVID-19,VECTOR-NR,RS-AD26,PF,0.5 ML DOSE VACCINE (JANSSEN): ICD-10-PCS | Mod: CV19,S$GLB,, | Performed by: EMERGENCY MEDICINE

## 2021-07-06 PROCEDURE — 91303 COVID-19,VECTOR-NR,RS-AD26,PF,0.5 ML DOSE VACCINE (JANSSEN): CPT | Mod: S$GLB,,, | Performed by: EMERGENCY MEDICINE

## 2021-09-29 ENCOUNTER — TELEPHONE (OUTPATIENT)
Dept: ORTHOPEDICS | Facility: CLINIC | Age: 66
End: 2021-09-29

## 2021-09-29 ENCOUNTER — OFFICE VISIT (OUTPATIENT)
Dept: URGENT CARE | Facility: CLINIC | Age: 66
End: 2021-09-29
Payer: MEDICARE

## 2021-09-29 VITALS
WEIGHT: 200 LBS | HEART RATE: 118 BPM | DIASTOLIC BLOOD PRESSURE: 104 MMHG | OXYGEN SATURATION: 97 % | TEMPERATURE: 98 F | HEIGHT: 70 IN | BODY MASS INDEX: 28.63 KG/M2 | SYSTOLIC BLOOD PRESSURE: 159 MMHG

## 2021-09-29 DIAGNOSIS — S83.92XA SPRAIN OF LEFT KNEE, UNSPECIFIED LIGAMENT, INITIAL ENCOUNTER: Primary | ICD-10-CM

## 2021-09-29 DIAGNOSIS — M25.562 ACUTE PAIN OF LEFT KNEE: ICD-10-CM

## 2021-09-29 PROBLEM — S83.90XA SPRAIN, KNEE: Status: ACTIVE | Noted: 2021-09-29

## 2021-09-29 PROCEDURE — 3080F PR MOST RECENT DIASTOLIC BLOOD PRESSURE >= 90 MM HG: ICD-10-PCS | Mod: CPTII,S$GLB,, | Performed by: INTERNAL MEDICINE

## 2021-09-29 PROCEDURE — 73562 X-RAY EXAM OF KNEE 3: CPT | Mod: LT,S$GLB,, | Performed by: RADIOLOGY

## 2021-09-29 PROCEDURE — 3077F PR MOST RECENT SYSTOLIC BLOOD PRESSURE >= 140 MM HG: ICD-10-PCS | Mod: CPTII,S$GLB,, | Performed by: INTERNAL MEDICINE

## 2021-09-29 PROCEDURE — 99214 PR OFFICE/OUTPT VISIT, EST, LEVL IV, 30-39 MIN: ICD-10-PCS | Mod: S$GLB,,, | Performed by: INTERNAL MEDICINE

## 2021-09-29 PROCEDURE — 1160F RVW MEDS BY RX/DR IN RCRD: CPT | Mod: CPTII,S$GLB,, | Performed by: INTERNAL MEDICINE

## 2021-09-29 PROCEDURE — 3008F PR BODY MASS INDEX (BMI) DOCUMENTED: ICD-10-PCS | Mod: CPTII,S$GLB,, | Performed by: INTERNAL MEDICINE

## 2021-09-29 PROCEDURE — 1159F MED LIST DOCD IN RCRD: CPT | Mod: CPTII,S$GLB,, | Performed by: INTERNAL MEDICINE

## 2021-09-29 PROCEDURE — 3008F BODY MASS INDEX DOCD: CPT | Mod: CPTII,S$GLB,, | Performed by: INTERNAL MEDICINE

## 2021-09-29 PROCEDURE — 99214 OFFICE O/P EST MOD 30 MIN: CPT | Mod: S$GLB,,, | Performed by: INTERNAL MEDICINE

## 2021-09-29 PROCEDURE — 73562 XR KNEE 3 VIEW LEFT: ICD-10-PCS | Mod: LT,S$GLB,, | Performed by: RADIOLOGY

## 2021-09-29 PROCEDURE — 1159F PR MEDICATION LIST DOCUMENTED IN MEDICAL RECORD: ICD-10-PCS | Mod: CPTII,S$GLB,, | Performed by: INTERNAL MEDICINE

## 2021-09-29 PROCEDURE — 3077F SYST BP >= 140 MM HG: CPT | Mod: CPTII,S$GLB,, | Performed by: INTERNAL MEDICINE

## 2021-09-29 PROCEDURE — 3080F DIAST BP >= 90 MM HG: CPT | Mod: CPTII,S$GLB,, | Performed by: INTERNAL MEDICINE

## 2021-09-29 PROCEDURE — 1160F PR REVIEW ALL MEDS BY PRESCRIBER/CLIN PHARMACIST DOCUMENTED: ICD-10-PCS | Mod: CPTII,S$GLB,, | Performed by: INTERNAL MEDICINE

## 2021-09-29 RX ORDER — NAPROXEN 500 MG/1
500 TABLET ORAL 2 TIMES DAILY WITH MEALS
Qty: 14 TABLET | Refills: 0 | Status: SHIPPED | OUTPATIENT
Start: 2021-09-29 | End: 2021-10-06

## 2022-04-25 ENCOUNTER — OFFICE VISIT (OUTPATIENT)
Dept: ORTHOPEDICS | Facility: CLINIC | Age: 67
End: 2022-04-25
Payer: MEDICARE

## 2022-04-25 ENCOUNTER — HOSPITAL ENCOUNTER (OUTPATIENT)
Dept: RADIOLOGY | Facility: HOSPITAL | Age: 67
Discharge: HOME | End: 2022-04-25
Attending: ORTHOPAEDIC SURGERY
Payer: MEDICARE

## 2022-04-25 VITALS — BODY MASS INDEX: 28.91 KG/M2 | WEIGHT: 201.94 LBS | HEIGHT: 70 IN

## 2022-04-25 DIAGNOSIS — M79.672 LEFT FOOT PAIN: Primary | ICD-10-CM

## 2022-04-25 DIAGNOSIS — M79.672 LEFT FOOT PAIN: ICD-10-CM

## 2022-04-25 DIAGNOSIS — M76.62 ACHILLES TENDINITIS OF LEFT LOWER EXTREMITY: ICD-10-CM

## 2022-04-25 PROCEDURE — 1159F MED LIST DOCD IN RCRD: CPT | Mod: CPTII,S$GLB,, | Performed by: ORTHOPAEDIC SURGERY

## 2022-04-25 PROCEDURE — 99999 PR PBB SHADOW E&M-EST. PATIENT-LVL III: ICD-10-PCS | Mod: PBBFAC,,, | Performed by: ORTHOPAEDIC SURGERY

## 2022-04-25 PROCEDURE — 1101F PR PT FALLS ASSESS DOC 0-1 FALLS W/OUT INJ PAST YR: ICD-10-PCS | Mod: CPTII,S$GLB,, | Performed by: ORTHOPAEDIC SURGERY

## 2022-04-25 PROCEDURE — 97760 PR ORTHOTIC MGMT&TRAINJ INITIAL ENC EA 15 MINS: ICD-10-PCS | Mod: GP,S$GLB,, | Performed by: ORTHOPAEDIC SURGERY

## 2022-04-25 PROCEDURE — 99999 PR PBB SHADOW E&M-EST. PATIENT-LVL III: CPT | Mod: PBBFAC,,, | Performed by: ORTHOPAEDIC SURGERY

## 2022-04-25 PROCEDURE — 1159F PR MEDICATION LIST DOCUMENTED IN MEDICAL RECORD: ICD-10-PCS | Mod: CPTII,S$GLB,, | Performed by: ORTHOPAEDIC SURGERY

## 2022-04-25 PROCEDURE — 97760 ORTHOTIC MGMT&TRAING 1ST ENC: CPT | Mod: GP,S$GLB,, | Performed by: ORTHOPAEDIC SURGERY

## 2022-04-25 PROCEDURE — 1125F PR PAIN SEVERITY QUANTIFIED, PAIN PRESENT: ICD-10-PCS | Mod: CPTII,S$GLB,, | Performed by: ORTHOPAEDIC SURGERY

## 2022-04-25 PROCEDURE — 1160F PR REVIEW ALL MEDS BY PRESCRIBER/CLIN PHARMACIST DOCUMENTED: ICD-10-PCS | Mod: CPTII,S$GLB,, | Performed by: ORTHOPAEDIC SURGERY

## 2022-04-25 PROCEDURE — 3008F BODY MASS INDEX DOCD: CPT | Mod: CPTII,S$GLB,, | Performed by: ORTHOPAEDIC SURGERY

## 2022-04-25 PROCEDURE — 3008F PR BODY MASS INDEX (BMI) DOCUMENTED: ICD-10-PCS | Mod: CPTII,S$GLB,, | Performed by: ORTHOPAEDIC SURGERY

## 2022-04-25 PROCEDURE — 3288F FALL RISK ASSESSMENT DOCD: CPT | Mod: CPTII,S$GLB,, | Performed by: ORTHOPAEDIC SURGERY

## 2022-04-25 PROCEDURE — 73630 X-RAY EXAM OF FOOT: CPT | Mod: TC,PO,LT

## 2022-04-25 PROCEDURE — 1125F AMNT PAIN NOTED PAIN PRSNT: CPT | Mod: CPTII,S$GLB,, | Performed by: ORTHOPAEDIC SURGERY

## 2022-04-25 PROCEDURE — 1160F RVW MEDS BY RX/DR IN RCRD: CPT | Mod: CPTII,S$GLB,, | Performed by: ORTHOPAEDIC SURGERY

## 2022-04-25 PROCEDURE — 1101F PT FALLS ASSESS-DOCD LE1/YR: CPT | Mod: CPTII,S$GLB,, | Performed by: ORTHOPAEDIC SURGERY

## 2022-04-25 PROCEDURE — 73630 X-RAY EXAM OF FOOT: CPT | Mod: 26,LT,, | Performed by: RADIOLOGY

## 2022-04-25 PROCEDURE — 99203 OFFICE O/P NEW LOW 30 MIN: CPT | Mod: 25,S$GLB,, | Performed by: ORTHOPAEDIC SURGERY

## 2022-04-25 PROCEDURE — 73630 XR FOOT COMPLETE 3 VIEW LEFT: ICD-10-PCS | Mod: 26,LT,, | Performed by: RADIOLOGY

## 2022-04-25 PROCEDURE — 3288F PR FALLS RISK ASSESSMENT DOCUMENTED: ICD-10-PCS | Mod: CPTII,S$GLB,, | Performed by: ORTHOPAEDIC SURGERY

## 2022-04-25 PROCEDURE — 99203 PR OFFICE/OUTPT VISIT, NEW, LEVL III, 30-44 MIN: ICD-10-PCS | Mod: 25,S$GLB,, | Performed by: ORTHOPAEDIC SURGERY

## 2022-04-25 NOTE — PROGRESS NOTES
66 years old pain in the posterior aspect of his left heel for about 3 weeks time trauma pain with weight-bearing relieved with rest well on good days 8 on bad days.  Tried anti-inflammatories with partial relief walking seems to make it worse    Exam shows tenderness the Achilles insertion no signs infection or instability Achilles tendon is intact    X-rays show degenerative type changes    Assessment:  Insertional Achilles tendinitis left    Plan:  Boot, relative rest, long-term stretching and strengthening, follow-up as needed          We performed a custom orthotic/brace fitting, adjusting and training with the patient. The patient demonstrated understanding and proper care. This was performed for 15 minutes.    Imaging studies ordered and reviewed by me    Further History  Aching pain  Worse with activity  Relieved with rest  No other associated symptoms  No other radiation    Further Exam  Alert and oriented  Pleasant  Contralateral limb has appropriate range of motion for age and condition  Contralateral limb has appropriate strength for age and condition  Contralateral limb has appropriate stability  for age and condition  No adenopathy  Pulses are appropriate for current condition  Skin is intact        Chief Complaint    Chief Complaint   Patient presents with    Left Heel - Pain       HPI  Vidal Haddad is a 66 y.o.  male who presents with       Past Medical History  Past Medical History:   Diagnosis Date    Gout     Hypertension     Myocardial infarction        Past Surgical History  Past Surgical History:   Procedure Laterality Date    ANGIOPLASTY  2020    COLONOSCOPY  06/09/2021    Dr. Greer    heart stent      skin graph         Medications  Current Outpatient Medications   Medication Sig    amLODIPine (NORVASC) 5 MG tablet Take 1 tablet (5 mg total) by mouth once daily.    ascorbic acid (VITAMIN C ORAL) Take 1,000 mg by mouth.    clopidogreL (PLAVIX) 75 mg tablet Take 1 tablet (75 mg  total) by mouth once daily.    levothyroxine (SYNTHROID) 50 MCG tablet Take 1 tablet (50 mcg total) by mouth once daily.    methylPREDNISolone (MEDROL DOSEPACK) 4 mg tablet use as directed    metoprolol tartrate (LOPRESSOR) 25 MG tablet Take 1 tablet (25 mg total) by mouth 2 (two) times daily.    multivit-min/ferrous fumarate (MULTI VITAMIN ORAL) Take by mouth.    naproxen sodium (ALEVE ORAL) Take by mouth.    rosuvastatin (CRESTOR) 40 MG Tab Take 1 tablet (40 mg total) by mouth every evening.    valsartan-hydrochlorothiazide (DIOVAN-HCT) 160-25 mg per tablet Take 1 tablet by mouth once daily.     No current facility-administered medications for this visit.       Allergies  Review of patient's allergies indicates:   Allergen Reactions    No known drug allergies        Family History  Family History   Problem Relation Age of Onset    Gout Father     Diabetes Father     Hypertension Mother        Social History  Social History     Socioeconomic History    Marital status: Single   Tobacco Use    Smoking status: Former Smoker    Smokeless tobacco: Never Used   Substance and Sexual Activity    Alcohol use: Yes     Alcohol/week: 7.0 standard drinks     Types: 7 Standard drinks or equivalent per week               Review of Systems     Constitutional: Negative    HENT: Negative  Eyes: Negative  Respiratory: Negative  Cardiovascular: Negative  Musculoskeletal: HPI  Skin: Negative  Neurological: Negative  Hematological: Negative  Endocrine: Negative                 Physical Exam    There were no vitals filed for this visit.  Body mass index is 28.98 kg/m².  Physical Examination:     General appearance -  well appearing, and in no distress  Mental status - awake  Neck - supple  Chest -  symmetric air entry  Heart - normal rate   Abdomen - soft      Assessment     1. Left foot pain          Plan

## 2022-04-29 PROBLEM — Z01.810 ENCOUNTER FOR PRE-OPERATIVE CARDIOVASCULAR CLEARANCE: Status: ACTIVE | Noted: 2022-04-29

## 2022-04-29 PROBLEM — I21.4 NSTEMI (NON-ST ELEVATED MYOCARDIAL INFARCTION): Status: RESOLVED | Noted: 2017-06-05 | Resolved: 2022-04-29

## 2022-04-29 PROBLEM — E78.2 MIXED HYPERLIPIDEMIA: Status: ACTIVE | Noted: 2022-04-29

## 2022-04-29 PROBLEM — E03.9 HYPOTHYROIDISM: Status: ACTIVE | Noted: 2022-04-29

## 2022-04-29 PROBLEM — I45.10 RBBB: Status: ACTIVE | Noted: 2022-04-29

## 2022-04-29 PROBLEM — Z95.5 STATUS POST INSERTION OF DRUG-ELUTING STENT INTO LEFT ANTERIOR DESCENDING (LAD) ARTERY: Status: ACTIVE | Noted: 2022-04-29

## 2023-01-26 ENCOUNTER — OUTSIDE PLACE OF SERVICE (OUTPATIENT)
Dept: ADMINISTRATIVE | Facility: OTHER | Age: 68
End: 2023-01-26
Payer: MEDICARE

## 2023-01-26 PROCEDURE — 99203 PR OFFICE/OUTPT VISIT, NEW, LEVL III, 30-44 MIN: ICD-10-PCS | Mod: ,,, | Performed by: STUDENT IN AN ORGANIZED HEALTH CARE EDUCATION/TRAINING PROGRAM

## 2023-01-26 PROCEDURE — 99203 OFFICE O/P NEW LOW 30 MIN: CPT | Mod: ,,, | Performed by: STUDENT IN AN ORGANIZED HEALTH CARE EDUCATION/TRAINING PROGRAM

## 2023-02-02 ENCOUNTER — OUTSIDE PLACE OF SERVICE (OUTPATIENT)
Dept: ADMINISTRATIVE | Facility: OTHER | Age: 68
End: 2023-02-02
Payer: MEDICARE

## 2023-04-03 ENCOUNTER — PATIENT MESSAGE (OUTPATIENT)
Dept: OTOLARYNGOLOGY | Facility: CLINIC | Age: 68
End: 2023-04-03
Payer: MEDICARE

## 2023-05-04 PROBLEM — N18.32 STAGE 3B CHRONIC KIDNEY DISEASE: Status: ACTIVE | Noted: 2023-05-04

## 2023-05-04 PROBLEM — F32.0 CURRENT MILD EPISODE OF MAJOR DEPRESSIVE DISORDER WITHOUT PRIOR EPISODE: Status: ACTIVE | Noted: 2023-05-04

## 2023-06-06 PROBLEM — I61.9: Status: ACTIVE | Noted: 2023-06-06

## 2023-09-08 ENCOUNTER — OUTSIDE PLACE OF SERVICE (OUTPATIENT)
Dept: OTOLARYNGOLOGY | Facility: CLINIC | Age: 68
End: 2023-09-08
Payer: MEDICARE

## 2023-12-06 PROBLEM — Z98.890 STATUS POST CRANIECTOMY: Chronic | Status: ACTIVE | Noted: 2023-02-13

## 2023-12-06 PROBLEM — N40.0 BPH (BENIGN PROSTATIC HYPERPLASIA): Status: ACTIVE | Noted: 2023-03-02

## 2023-12-06 PROBLEM — Z78.9 IMPAIRED MOBILITY AND ADLS: Status: ACTIVE | Noted: 2023-02-13

## 2023-12-06 PROBLEM — M25.562 BILATERAL KNEE PAIN: Status: ACTIVE | Noted: 2023-03-02

## 2023-12-06 PROBLEM — Z98.890 STATUS POST CRANIECTOMY: Status: ACTIVE | Noted: 2023-02-13

## 2023-12-06 PROBLEM — I61.9: Chronic | Status: ACTIVE | Noted: 2023-06-06

## 2023-12-06 PROBLEM — Z95.5 STATUS POST INSERTION OF DRUG-ELUTING STENT INTO LEFT ANTERIOR DESCENDING (LAD) ARTERY: Chronic | Status: ACTIVE | Noted: 2022-04-29

## 2023-12-06 PROBLEM — Z86.73 HISTORY OF TRANSIENT ISCHEMIC ATTACK (TIA): Chronic | Status: ACTIVE | Noted: 2023-06-12

## 2023-12-06 PROBLEM — S83.90XA SPRAIN, KNEE: Status: RESOLVED | Noted: 2021-09-29 | Resolved: 2023-12-06

## 2023-12-06 PROBLEM — I25.10 CORONARY ARTERY DISEASE INVOLVING NATIVE CORONARY ARTERY OF NATIVE HEART: Chronic | Status: ACTIVE | Noted: 2017-06-06

## 2023-12-06 PROBLEM — Z74.09 IMPAIRED MOBILITY AND ADLS: Status: ACTIVE | Noted: 2023-02-13

## 2023-12-06 PROBLEM — M25.561 BILATERAL KNEE PAIN: Status: ACTIVE | Noted: 2023-03-02

## 2023-12-06 PROBLEM — I62.00 SUBDURAL HEMORRHAGE: Chronic | Status: RESOLVED | Noted: 2023-02-13 | Resolved: 2023-12-06

## 2023-12-06 PROBLEM — G47.00 INSOMNIA: Status: ACTIVE | Noted: 2023-03-02

## 2023-12-06 PROBLEM — Z86.73 HISTORY OF TRANSIENT ISCHEMIC ATTACK (TIA): Status: ACTIVE | Noted: 2023-06-12

## 2023-12-06 PROBLEM — E03.9 HYPOTHYROIDISM: Chronic | Status: ACTIVE | Noted: 2022-04-29

## 2023-12-06 PROBLEM — I10 ESSENTIAL HYPERTENSION: Chronic | Status: ACTIVE | Noted: 2017-06-07

## 2023-12-06 PROBLEM — I62.00 SUBDURAL HEMORRHAGE: Status: RESOLVED | Noted: 2023-02-13 | Resolved: 2023-12-06

## 2023-12-06 PROBLEM — M25.561 BILATERAL KNEE PAIN: Status: RESOLVED | Noted: 2023-03-02 | Resolved: 2023-12-06

## 2023-12-06 PROBLEM — M25.562 BILATERAL KNEE PAIN: Status: RESOLVED | Noted: 2023-03-02 | Resolved: 2023-12-06

## 2023-12-06 PROBLEM — I62.00 SUBDURAL HEMORRHAGE: Status: ACTIVE | Noted: 2023-02-13

## 2023-12-06 PROBLEM — F32.0 CURRENT MILD EPISODE OF MAJOR DEPRESSIVE DISORDER WITHOUT PRIOR EPISODE: Chronic | Status: ACTIVE | Noted: 2023-05-04

## 2023-12-06 PROBLEM — Z01.810 ENCOUNTER FOR PRE-OPERATIVE CARDIOVASCULAR CLEARANCE: Status: RESOLVED | Noted: 2022-04-29 | Resolved: 2023-12-06

## 2023-12-06 PROBLEM — I61.9: Chronic | Status: RESOLVED | Noted: 2023-06-06 | Resolved: 2023-12-06

## 2024-02-19 ENCOUNTER — OCCUPATIONAL HEALTH (OUTPATIENT)
Dept: URGENT CARE | Facility: CLINIC | Age: 69
End: 2024-02-19

## 2024-02-19 PROCEDURE — 99499 UNLISTED E&M SERVICE: CPT | Mod: S$GLB,,, | Performed by: NURSE PRACTITIONER

## 2024-02-19 PROCEDURE — 92552 PURE TONE AUDIOMETRY AIR: CPT | Mod: S$GLB,,, | Performed by: NURSE PRACTITIONER

## 2024-02-19 PROCEDURE — 80305 DRUG TEST PRSMV DIR OPT OBS: CPT | Mod: S$GLB,,, | Performed by: NURSE PRACTITIONER
